# Patient Record
Sex: FEMALE | URBAN - METROPOLITAN AREA
[De-identification: names, ages, dates, MRNs, and addresses within clinical notes are randomized per-mention and may not be internally consistent; named-entity substitution may affect disease eponyms.]

---

## 2023-10-10 ENCOUNTER — APPOINTMENT (OUTPATIENT)
Dept: URBAN - METROPOLITAN AREA CLINIC 306 | Age: 78
Setting detail: DERMATOLOGY
End: 2023-10-10

## 2023-10-10 PROBLEM — C44.91 BASAL CELL CARCINOMA OF SKIN, UNSPECIFIED: Status: ACTIVE | Noted: 2023-10-10

## 2023-10-10 PROCEDURE — OTHER MIPS QUALITY: OTHER

## 2023-10-10 PROCEDURE — 99202 OFFICE O/P NEW SF 15 MIN: CPT

## 2023-10-10 PROCEDURE — OTHER COUNSELING: OTHER

## 2023-10-10 NOTE — PROCEDURE: MIPS QUALITY
Quality 226: Preventive Care And Screening: Tobacco Use: Screening And Cessation Intervention: Tobacco Screening not Performed
Detail Level: Detailed
Quality 431: Preventive Care And Screening: Unhealthy Alcohol Use - Screening: Patient not screened for unhealthy alcohol use using a systematic screening method

## 2023-10-10 NOTE — PROCEDURE: COUNSELING
Patient Specific Counseling (Will Not Stick From Patient To Patient): Pt to set up appt with Anya for initial evaluation.
Detail Level: Detailed

## 2023-10-10 NOTE — HPI: SKIN LESION
How Severe Is Your Skin Lesion?: moderate
Is This A New Presentation, Or A Follow-Up?: Skin Lesion
Additional History: Has Basal on her lip. Doesn’t want to do mohs on the area and would like to see about SRT.

## 2023-10-24 ENCOUNTER — APPOINTMENT (OUTPATIENT)
Dept: URBAN - METROPOLITAN AREA CLINIC 306 | Age: 78
Setting detail: DERMATOLOGY
End: 2023-10-30

## 2023-10-24 ENCOUNTER — APPOINTMENT (OUTPATIENT)
Dept: URBAN - METROPOLITAN AREA CLINIC 306 | Age: 78
Setting detail: DERMATOLOGY
End: 2023-10-24

## 2023-10-24 ENCOUNTER — APPOINTMENT (OUTPATIENT)
Dept: URBAN - METROPOLITAN AREA CLINIC 299 | Age: 78
Setting detail: DERMATOLOGY
End: 2023-10-24

## 2023-10-24 PROBLEM — C44.01 BASAL CELL CARCINOMA OF SKIN OF LIP: Status: ACTIVE | Noted: 2023-10-24

## 2023-10-24 PROCEDURE — OTHER IMAGE GUIDED - SUPERFICIAL RADIOTHERAPY: OTHER

## 2023-10-24 PROCEDURE — 77334 RADIATION TREATMENT AID(S): CPT

## 2023-10-24 PROCEDURE — 77261 THER RADIOLOGY TX PLNG SMPL: CPT

## 2023-10-24 PROCEDURE — G6001 ECHO GUIDANCE RADIOTHERAPY: HCPCS

## 2023-10-24 PROCEDURE — OTHER DIAGNOSIS COMMENT: OTHER

## 2023-10-24 PROCEDURE — OTHER IMAGE GUIDED - SUPERFICIAL RADIOTHERAPY: SIMULATION VISIT: OTHER

## 2023-10-24 PROCEDURE — 77300 RADIATION THERAPY DOSE PLAN: CPT

## 2023-10-24 PROCEDURE — 99213 OFFICE O/P EST LOW 20 MIN: CPT | Mod: 25

## 2023-10-24 PROCEDURE — 77280 THER RAD SIMULAJ FIELD SMPL: CPT

## 2023-10-24 NOTE — PROCEDURE: DIAGNOSIS COMMENT
Render Risk Assessment In Note?: no
Comment: Pathology showed a basaloid proliferation that the pathologist thought was most likely BCC.
Detail Level: Simple

## 2023-10-24 NOTE — PROCEDURE: IMAGE GUIDED - SUPERFICIAL RADIOTHERAPY: SIMULATION VISIT
Bill For Treatment Planning: Yes- (Simple Planning- 1 Site: [id number]) Bill For Treatment Planning: Yes- (Simple Planning- 1 Site: [id number])

## 2023-10-24 NOTE — PROCEDURE: IMAGE GUIDED - SUPERFICIAL RADIOTHERAPY
Pt c/o full body rash and itching after first dose of antibiotic. Pt now is c/o midsternal chest pain and states she does see a cardiologist. Dr. Gross made aware and this RN is awaiting new orders at this time.   
Detail Level: Detailed
Field Number: 1
Lesion Dimensions-X Axis In Cm: 0.5
Shield Size In Cm: 1.0 X 1.0
Applicator Size In Cm: 1.5 cm
Energy (Kv): 70
Treatment Time (Min): 0.42
Time, Dose, Fractionation Factor (Tdf) For Prescription 1: 95
Daily Dose (Cgy): 270.06
Number Of Fractions For Prescription 1: 20
Treatments Per Week: 3
Total Dose For Prescription 1 (Cgy): 5401.20
Add A Second Prescription?: No
Additional Fraction(S) Needed:: 0
Bill For Physics Consultation: No - Render Text Only
Physics Documentation: (Physics Check Verbiage)

## 2023-10-24 NOTE — PROCEDURE: IMAGE GUIDED - SUPERFICIAL RADIOTHERAPY: SIMULATION VISIT
Additional Comments (Add Customization Of Note Here): Well healed biopsy site.  Pt given skin care instructions.  \\nThe rational for Radiotherapy was explained to the patient. The risks and benefits were explained to the patient in detail. Specifically the risk of infection, scarring, radiation dermatitis, prolonged wound healing, incomplete removal, nerve injury, inability to clear the tumor and recurrence were addressed.  The treatment site was clearly identified and confirmed by the patient.

## 2023-10-24 NOTE — PROCEDURE: IMAGE GUIDED - SUPERFICIAL RADIOTHERAPY: SIMULATION VISIT
Simulation Documentation: Physician Orders: Initial Simulation Plan: Simulation with per Fraction Ultrasound\\n\\nPlan: Per the request of Dr.Stephen Roberts, this patient was seen today for Superficial Radiation Therapy planning requiring initial simulation in preparation for treatment of non-melanoma skin cancer. Simulation is necessary to determine the correct patient and treatment portal positioning, to deliver safe and effective radiation therapy. A high-frequency ultrasound image was acquired prior to treatment today for two- dimensional evaluation of tumor volume and will be repeated per fraction for response to treatment, in addition, to geometric accuracy of field placement. Physician evaluation of the ultrasound tumor depth, width, and breadth will be ongoing through the course of treatment and is deemed medically necessary ensuring efficacy of treatment and determine whether to proceed with delivery of therapeutic. Today’s image and setup were evaluated to determine the initiation of treatment. All appropriate blocking and treatment parameters were verified by the radiation therapist and provider.\\nPer Dr. Garrett Roberts, continued per fraction ultrasound guidance and simulation are required for field placement, measurement of tumor depth, width and breadth, progress, and edema monitoring.\\nPer the request of Dr. Garrett Roberts continuing medical physics review as per radiotherapy standard of care post every 5th fraction for the patient, including assessment of treatment parameters,  of dose delivery, and review of patient treatment documentation in support of the provider, is ordered, ensuring efficacy and continued safe delivery of radiotherapy. Included in the physics check is a review of patient setup information, all pertinent simulation and treatment photograph(s) checks, prescription, dose calculation verification, per fraction dose charted correctly, elapsed days and treatment days correctly charted, cumulative dose correct, and review of any prescription changes. Continued medical physics review post every 5th fraction of radiotherapy is requested by the provider for appropriate radiotherapy management and is deemed medically necessary and standard of care.

## 2023-10-30 ENCOUNTER — APPOINTMENT (OUTPATIENT)
Dept: URBAN - METROPOLITAN AREA CLINIC 306 | Age: 78
Setting detail: DERMATOLOGY
End: 2023-10-31

## 2023-10-30 PROBLEM — C44.01 BASAL CELL CARCINOMA OF SKIN OF LIP: Status: ACTIVE | Noted: 2023-10-30

## 2023-10-30 PROCEDURE — OTHER IMAGE GUIDED - SUPERFICIAL RADIOTHERAPY: OTHER

## 2023-10-30 PROCEDURE — OTHER DIAGNOSIS COMMENT: OTHER

## 2023-10-30 PROCEDURE — 77280 THER RAD SIMULAJ FIELD SMPL: CPT

## 2023-10-30 PROCEDURE — OTHER IMAGE GUIDED - SUPERFICIAL RADIOTHERAPY: TREATMENT VISIT: OTHER

## 2023-10-30 PROCEDURE — G6001 ECHO GUIDANCE RADIOTHERAPY: HCPCS | Mod: XU

## 2023-10-30 PROCEDURE — 77401 RADIATION TX DELIVERY SUPFC: CPT

## 2023-10-30 NOTE — PROCEDURE: IMAGE GUIDED - SUPERFICIAL RADIOTHERAPY
Detail Level: Detailed
Field Number: 1
Lesion Dimensions-X Axis In Cm: 0.5
Shield Size In Cm: 1.0 X 1.0
Applicator Size In Cm: 1.5 cm
Energy (Kv): 70
Treatment Time (Min): 0.42
Time, Dose, Fractionation Factor (Tdf) For Prescription 1: 95
Daily Dose (Cgy): 270.06
Number Of Fractions For Prescription 1: 20
Treatments Per Week: 3
Total Dose For Prescription 1 (Cgy): 5401.20
Add A Second Prescription?: No
Additional Fraction(S) Needed:: 0
Bill For Physics Consultation: No - Render Text Only
Physics Documentation: (Physics Check Verbiage)

## 2023-10-30 NOTE — PROCEDURE: IMAGE GUIDED - SUPERFICIAL RADIOTHERAPY: TREATMENT VISIT
Ultrasound Not Used Text: Ultrasound was not performed today due to
Additional Change To Daily Dosage Administered Mid Treatment?: No
Fraction Number: 1
Energy (Kv): 70
Bill For Set Radiation Therapy Field (33693)?: Yes
Treatment Documentation: Physician Orders: Radiotherapy Treatment Plan: Superficial Radiotherapy with Ultrasound\\n\\nPlan: This patient has been treated today with image-guided superficial radiation therapy for non-melanoma skin cancer.  Written informed consent has been previously obtained from this patient for this treatment. This consent is documented in the patient's chart. The patient gave verbal consent to continue treatment today.\\nThe patient was treated with a specific radiation dose and setup as prescribed by the provider listed on this visit note. A Radiation Therapist performed administration of radiation under the supervision of a provider.\\nThe treatment parameters and cumulative dose are indicated above. Prior to administering the radiation, the patient underwent a verification therapeutic radiology simulation-aided field setting defining relevant normal and abnormal target anatomy and acquiring images with a separate and distinct diagnostic high-frequency ultrasound to delineate tissues and determine whether to proceed with delivery of therapeutic, in addition to retrieve data necessary to develop an optimal radiation treatment process for the patient. The field placement simulation documents any change seen in overall tumor volume documented in the patient’s record, allows the clinician to indicate any needed changes in the treatment plan and/or prescription, provides diagnostic evaluation as the basis for performing the therapeutic procedure, and clearly identifies the information needed to decide to proceed with the therapeutic procedure. This process includes verification of the treatment port(s) and proper treatment positioning. All treatment ports were photographed within electronic medical records. The patient's lead blocking along with gross tumor volume and margin was confirmed. Considering superficial radiotherapy is clinical in setup, this requires the physician and radiation therapist to clarify the location interest being treated against initial images, ultrasound, pathology, and patient anatomy. Care was taken to ensure dawson treated were geometrically accurate and properly positioned using therapeutic radiology simulation-aided field setting verification per fraction. This process is also utilized to determine if any prescription or setup changes are necessary. These steps are therefore medically necessary to ensure safe and effective administration of radiation. Ongoing therapeutic radiology simulation-aided field setting verification is ordered throughout the course of therapy.  A high-frequency ultrasound image was acquired today for a two-dimensional evaluation of the tumor volume, depth, width, breadth, review of prior response to treatment, provide geometric accuracy of field placement, and determine whether to proceed with therapeutic delivery.\\nThe field placement and ultrasound imaging, per fraction, is separate and distinct from the initial simulation and is an important task in providing safe administration of superficial radiation therapy. Physician evaluation of the ultrasound information will be ongoing throughout the course of treatment and is deemed medically necessary to ensure the efficacy of treatment, whether to proceed with therapeutic delivery, and determine any necessary changes. Today's images were evaluated for determination of continuation of treatment with the current plan or with necessary changes as appropriate. According to the review of verification therapeutic radiology simulation-aided field setting and imaging, no change is required.\\nAdditionally, the use of ultrasound visualization and targeted assessment allows the patient to be able to see her cancer progress, encouraging the patient to complete and maintain compliance through the full course of prescribed radiotherapy. Per Dr. Garrett Roberts, continued ultrasound guidance and therapeutic radiology simulation-aided field setting verification per fraction is required for field placement, measurement of tumor depth, tissues evaluation, progress, acute effect monitoring, and determination for therapeutic treatment delivery is appropriate.
Daily Dosage (Cgy): 270.06
Additional Comments (Add Customization Of Note Here): Well healed biopsy site.
Add X Modifier?: VIEYRA - Unusual Non-Overlapping Service
Calculate Total Cumulative Dose Automatically Or Manually: Manually
Ultrasound Used Text: High frequency ultrasound depth is 0.98 mm, which is +0.06 mm in difference from previous imaging.

## 2023-10-31 ENCOUNTER — APPOINTMENT (OUTPATIENT)
Dept: URBAN - METROPOLITAN AREA CLINIC 306 | Age: 78
Setting detail: DERMATOLOGY
End: 2023-10-31

## 2023-10-31 PROBLEM — C44.01 BASAL CELL CARCINOMA OF SKIN OF LIP: Status: ACTIVE | Noted: 2023-10-31

## 2023-10-31 PROCEDURE — OTHER IMAGE GUIDED - SUPERFICIAL RADIOTHERAPY: OTHER

## 2023-10-31 PROCEDURE — OTHER IMAGE GUIDED - SUPERFICIAL RADIOTHERAPY: TREATMENT VISIT: OTHER

## 2023-10-31 PROCEDURE — 77401 RADIATION TX DELIVERY SUPFC: CPT

## 2023-10-31 PROCEDURE — G6001 ECHO GUIDANCE RADIOTHERAPY: HCPCS | Mod: XU

## 2023-10-31 PROCEDURE — 77280 THER RAD SIMULAJ FIELD SMPL: CPT

## 2023-10-31 PROCEDURE — OTHER DIAGNOSIS COMMENT: OTHER

## 2023-10-31 NOTE — PROCEDURE: IMAGE GUIDED - SUPERFICIAL RADIOTHERAPY: TREATMENT VISIT
Ultrasound Not Used Text: Ultrasound was not performed today due to
Additional Change To Daily Dosage Administered Mid Treatment?: No
Fraction Number: 2
Energy (Kv): 70
Bill For Set Radiation Therapy Field (88562)?: Yes
Treatment Documentation: Physician Orders: Radiotherapy Treatment Plan: Superficial Radiotherapy with Ultrasound\\n\\nPlan: This patient has been treated today with image-guided superficial radiation therapy for non-melanoma skin cancer.  Written informed consent has been previously obtained from this patient for this treatment. This consent is documented in the patient's chart. The patient gave verbal consent to continue treatment today.\\nThe patient was treated with a specific radiation dose and setup as prescribed by the provider listed on this visit note. A Radiation Therapist performed administration of radiation under the supervision of a provider.\\nThe treatment parameters and cumulative dose are indicated above. Prior to administering the radiation, the patient underwent a verification therapeutic radiology simulation-aided field setting defining relevant normal and abnormal target anatomy and acquiring images with a separate and distinct diagnostic high-frequency ultrasound to delineate tissues and determine whether to proceed with delivery of therapeutic, in addition to retrieve data necessary to develop an optimal radiation treatment process for the patient. The field placement simulation documents any change seen in overall tumor volume documented in the patient’s record, allows the clinician to indicate any needed changes in the treatment plan and/or prescription, provides diagnostic evaluation as the basis for performing the therapeutic procedure, and clearly identifies the information needed to decide to proceed with the therapeutic procedure. This process includes verification of the treatment port(s) and proper treatment positioning. All treatment ports were photographed within electronic medical records. The patient's lead blocking along with gross tumor volume and margin was confirmed. Considering superficial radiotherapy is clinical in setup, this requires the physician and radiation therapist to clarify the location interest being treated against initial images, ultrasound, pathology, and patient anatomy. Care was taken to ensure dawson treated were geometrically accurate and properly positioned using therapeutic radiology simulation-aided field setting verification per fraction. This process is also utilized to determine if any prescription or setup changes are necessary. These steps are therefore medically necessary to ensure safe and effective administration of radiation. Ongoing therapeutic radiology simulation-aided field setting verification is ordered throughout the course of therapy.  A high-frequency ultrasound image was acquired today for a two-dimensional evaluation of the tumor volume, depth, width, breadth, review of prior response to treatment, provide geometric accuracy of field placement, and determine whether to proceed with therapeutic delivery.\\nThe field placement and ultrasound imaging, per fraction, is separate and distinct from the initial simulation and is an important task in providing safe administration of superficial radiation therapy. Physician evaluation of the ultrasound information will be ongoing throughout the course of treatment and is deemed medically necessary to ensure the efficacy of treatment, whether to proceed with therapeutic delivery, and determine any necessary changes. Today's images were evaluated for determination of continuation of treatment with the current plan or with necessary changes as appropriate. According to the review of verification therapeutic radiology simulation-aided field setting and imaging, no change is required.\\nAdditionally, the use of ultrasound visualization and targeted assessment allows the patient to be able to see her cancer progress, encouraging the patient to complete and maintain compliance through the full course of prescribed radiotherapy. Per Dr. Garrett Roberts, continued ultrasound guidance and therapeutic radiology simulation-aided field setting verification per fraction is required for field placement, measurement of tumor depth, tissues evaluation, progress, acute effect monitoring, and determination for therapeutic treatment delivery is appropriate.
Daily Dosage (Cgy): 270.06
Additional Comments (Add Customization Of Note Here): No discomfort.  Pt's site is well healed from biopsy.
Total Cumulative Dose (Cgy): 540.12
Add X Modifier?: VIEYRA - Unusual Non-Overlapping Service
Calculate Total Cumulative Dose Automatically Or Manually: Manually
Prescription Used: 1
Ultrasound Used Text: High frequency ultrasound depth is 0.76 mm, which is -0.22 mm in difference from previous imaging.

## 2023-11-02 ENCOUNTER — APPOINTMENT (OUTPATIENT)
Dept: URBAN - METROPOLITAN AREA CLINIC 306 | Age: 78
Setting detail: DERMATOLOGY
End: 2023-11-02

## 2023-11-02 PROBLEM — C44.01 BASAL CELL CARCINOMA OF SKIN OF LIP: Status: ACTIVE | Noted: 2023-11-02

## 2023-11-02 PROCEDURE — OTHER IMAGE GUIDED - SUPERFICIAL RADIOTHERAPY: TREATMENT VISIT: OTHER

## 2023-11-02 PROCEDURE — OTHER DIAGNOSIS COMMENT: OTHER

## 2023-11-02 PROCEDURE — 77280 THER RAD SIMULAJ FIELD SMPL: CPT

## 2023-11-02 PROCEDURE — OTHER IMAGE GUIDED - SUPERFICIAL RADIOTHERAPY: OTHER

## 2023-11-02 PROCEDURE — 77401 RADIATION TX DELIVERY SUPFC: CPT

## 2023-11-02 PROCEDURE — G6001 ECHO GUIDANCE RADIOTHERAPY: HCPCS | Mod: XU

## 2023-11-02 NOTE — PROCEDURE: IMAGE GUIDED - SUPERFICIAL RADIOTHERAPY: TREATMENT VISIT
Ultrasound Not Used Text: Ultrasound was not performed today due to
Additional Change To Daily Dosage Administered Mid Treatment?: No
Fraction Number: 3
Energy (Kv): 70
Bill For Set Radiation Therapy Field (93770)?: Yes
Treatment Documentation: Physician Orders: Radiotherapy Treatment Plan: Superficial Radiotherapy with Ultrasound\\n\\nPlan: This patient has been treated today with image-guided superficial radiation therapy for non-melanoma skin cancer.  Written informed consent has been previously obtained from this patient for this treatment. This consent is documented in the patient's chart. The patient gave verbal consent to continue treatment today.\\nThe patient was treated with a specific radiation dose and setup as prescribed by the provider listed on this visit note. A Radiation Therapist performed administration of radiation under the supervision of a provider.\\nThe treatment parameters and cumulative dose are indicated above. Prior to administering the radiation, the patient underwent a verification therapeutic radiology simulation-aided field setting defining relevant normal and abnormal target anatomy and acquiring images with a separate and distinct diagnostic high-frequency ultrasound to delineate tissues and determine whether to proceed with delivery of therapeutic, in addition to retrieve data necessary to develop an optimal radiation treatment process for the patient. The field placement simulation documents any change seen in overall tumor volume documented in the patient’s record, allows the clinician to indicate any needed changes in the treatment plan and/or prescription, provides diagnostic evaluation as the basis for performing the therapeutic procedure, and clearly identifies the information needed to decide to proceed with the therapeutic procedure. This process includes verification of the treatment port(s) and proper treatment positioning. All treatment ports were photographed within electronic medical records. The patient's lead blocking along with gross tumor volume and margin was confirmed. Considering superficial radiotherapy is clinical in setup, this requires the physician and radiation therapist to clarify the location interest being treated against initial images, ultrasound, pathology, and patient anatomy. Care was taken to ensure dawson treated were geometrically accurate and properly positioned using therapeutic radiology simulation-aided field setting verification per fraction. This process is also utilized to determine if any prescription or setup changes are necessary. These steps are therefore medically necessary to ensure safe and effective administration of radiation. Ongoing therapeutic radiology simulation-aided field setting verification is ordered throughout the course of therapy.  A high-frequency ultrasound image was acquired today for a two-dimensional evaluation of the tumor volume, depth, width, breadth, review of prior response to treatment, provide geometric accuracy of field placement, and determine whether to proceed with therapeutic delivery.\\nThe field placement and ultrasound imaging, per fraction, is separate and distinct from the initial simulation and is an important task in providing safe administration of superficial radiation therapy. Physician evaluation of the ultrasound information will be ongoing throughout the course of treatment and is deemed medically necessary to ensure the efficacy of treatment, whether to proceed with therapeutic delivery, and determine any necessary changes. Today's images were evaluated for determination of continuation of treatment with the current plan or with necessary changes as appropriate. According to the review of verification therapeutic radiology simulation-aided field setting and imaging, no change is required.\\nAdditionally, the use of ultrasound visualization and targeted assessment allows the patient to be able to see her cancer progress, encouraging the patient to complete and maintain compliance through the full course of prescribed radiotherapy. Per Dr. Garrett Roberts, continued ultrasound guidance and therapeutic radiology simulation-aided field setting verification per fraction is required for field placement, measurement of tumor depth, tissues evaluation, progress, acute effect monitoring, and determination for therapeutic treatment delivery is appropriate.
Daily Dosage (Cgy): 270.06
Additional Comments (Add Customization Of Note Here): Well healed biopsy site.
Total Cumulative Dose (Cgy): 810.18
Add X Modifier?: VIEYRA - Unusual Non-Overlapping Service
Calculate Total Cumulative Dose Automatically Or Manually: Manually
Prescription Used: 1
Ultrasound Used Text: High frequency ultrasound depth is 0.96 mm, which is +0.20 mm in difference from previous imaging.

## 2023-11-06 ENCOUNTER — APPOINTMENT (OUTPATIENT)
Dept: URBAN - METROPOLITAN AREA CLINIC 306 | Age: 78
Setting detail: DERMATOLOGY
End: 2023-11-07

## 2023-11-06 PROBLEM — C44.01 BASAL CELL CARCINOMA OF SKIN OF LIP: Status: ACTIVE | Noted: 2023-11-06

## 2023-11-06 PROCEDURE — OTHER IMAGE GUIDED - SUPERFICIAL RADIOTHERAPY: TREATMENT VISIT: OTHER

## 2023-11-06 PROCEDURE — 77401 RADIATION TX DELIVERY SUPFC: CPT

## 2023-11-06 PROCEDURE — G6001 ECHO GUIDANCE RADIOTHERAPY: HCPCS | Mod: XU

## 2023-11-06 PROCEDURE — OTHER IMAGE GUIDED - SUPERFICIAL RADIOTHERAPY: OTHER

## 2023-11-06 PROCEDURE — 77280 THER RAD SIMULAJ FIELD SMPL: CPT

## 2023-11-06 PROCEDURE — OTHER DIAGNOSIS COMMENT: OTHER

## 2023-11-06 NOTE — PROCEDURE: IMAGE GUIDED - SUPERFICIAL RADIOTHERAPY: TREATMENT VISIT
Ultrasound Not Used Text: Ultrasound was not performed today due to
Additional Change To Daily Dosage Administered Mid Treatment?: No
Fraction Number: 4
Energy (Kv): 70
Bill For Set Radiation Therapy Field (59687)?: Yes
Treatment Documentation: Physician Orders: Radiotherapy Treatment Plan: Superficial Radiotherapy with Ultrasound\\n\\nPlan: This patient has been treated today with image-guided superficial radiation therapy for non-melanoma skin cancer.  Written informed consent has been previously obtained from this patient for this treatment. This consent is documented in the patient's chart. The patient gave verbal consent to continue treatment today.\\nThe patient was treated with a specific radiation dose and setup as prescribed by the provider listed on this visit note. A Radiation Therapist performed administration of radiation under the supervision of a provider.\\nThe treatment parameters and cumulative dose are indicated above. Prior to administering the radiation, the patient underwent a verification therapeutic radiology simulation-aided field setting defining relevant normal and abnormal target anatomy and acquiring images with a separate and distinct diagnostic high-frequency ultrasound to delineate tissues and determine whether to proceed with delivery of therapeutic, in addition to retrieve data necessary to develop an optimal radiation treatment process for the patient. The field placement simulation documents any change seen in overall tumor volume documented in the patient’s record, allows the clinician to indicate any needed changes in the treatment plan and/or prescription, provides diagnostic evaluation as the basis for performing the therapeutic procedure, and clearly identifies the information needed to decide to proceed with the therapeutic procedure. This process includes verification of the treatment port(s) and proper treatment positioning. All treatment ports were photographed within electronic medical records. The patient's lead blocking along with gross tumor volume and margin was confirmed. Considering superficial radiotherapy is clinical in setup, this requires the physician and radiation therapist to clarify the location interest being treated against initial images, ultrasound, pathology, and patient anatomy. Care was taken to ensure dawson treated were geometrically accurate and properly positioned using therapeutic radiology simulation-aided field setting verification per fraction. This process is also utilized to determine if any prescription or setup changes are necessary. These steps are therefore medically necessary to ensure safe and effective administration of radiation. Ongoing therapeutic radiology simulation-aided field setting verification is ordered throughout the course of therapy.  A high-frequency ultrasound image was acquired today for a two-dimensional evaluation of the tumor volume, depth, width, breadth, review of prior response to treatment, provide geometric accuracy of field placement, and determine whether to proceed with therapeutic delivery.\\nThe field placement and ultrasound imaging, per fraction, is separate and distinct from the initial simulation and is an important task in providing safe administration of superficial radiation therapy. Physician evaluation of the ultrasound information will be ongoing throughout the course of treatment and is deemed medically necessary to ensure the efficacy of treatment, whether to proceed with therapeutic delivery, and determine any necessary changes. Today's images were evaluated for determination of continuation of treatment with the current plan or with necessary changes as appropriate. According to the review of verification therapeutic radiology simulation-aided field setting and imaging, no change is required.\\nAdditionally, the use of ultrasound visualization and targeted assessment allows the patient to be able to see her cancer progress, encouraging the patient to complete and maintain compliance through the full course of prescribed radiotherapy. Per Dr. Garrett Roberts, continued ultrasound guidance and therapeutic radiology simulation-aided field setting verification per fraction is required for field placement, measurement of tumor depth, tissues evaluation, progress, acute effect monitoring, and determination for therapeutic treatment delivery is appropriate.
Daily Dosage (Cgy): 270.06
Additional Comments (Add Customization Of Note Here): Skin still looks great.  No tenderness or complaints.
Total Cumulative Dose (Cgy): 1080.24
Add X Modifier?: VIEYRA - Unusual Non-Overlapping Service
Calculate Total Cumulative Dose Automatically Or Manually: Manually
Prescription Used: 1
Ultrasound Used Text: High frequency ultrasound depth is 0.96 mm, which is 0.0 mm in difference from previous imaging.

## 2023-11-07 ENCOUNTER — APPOINTMENT (OUTPATIENT)
Dept: URBAN - METROPOLITAN AREA CLINIC 306 | Age: 78
Setting detail: DERMATOLOGY
End: 2023-11-07

## 2023-11-07 PROBLEM — C44.01 BASAL CELL CARCINOMA OF SKIN OF LIP: Status: ACTIVE | Noted: 2023-11-07

## 2023-11-07 PROCEDURE — 77280 THER RAD SIMULAJ FIELD SMPL: CPT

## 2023-11-07 PROCEDURE — 77401 RADIATION TX DELIVERY SUPFC: CPT

## 2023-11-07 PROCEDURE — OTHER IMAGE GUIDED - SUPERFICIAL RADIOTHERAPY: TREATMENT VISIT: OTHER

## 2023-11-07 PROCEDURE — G6001 ECHO GUIDANCE RADIOTHERAPY: HCPCS | Mod: XU

## 2023-11-07 PROCEDURE — OTHER IMAGE GUIDED - SUPERFICIAL RADIOTHERAPY: EVALUATION VISIT: OTHER

## 2023-11-07 PROCEDURE — OTHER IMAGE GUIDED - SUPERFICIAL RADIOTHERAPY: OTHER

## 2023-11-07 PROCEDURE — OTHER DIAGNOSIS COMMENT: OTHER

## 2023-11-07 PROCEDURE — 77427 RADIATION TX MANAGEMENT X5: CPT

## 2023-11-07 NOTE — PROCEDURE: IMAGE GUIDED - SUPERFICIAL RADIOTHERAPY: TREATMENT VISIT
Ultrasound Not Used Text: Ultrasound was not performed today due to
Additional Change To Daily Dosage Administered Mid Treatment?: No
Fraction Number: 5
Energy (Kv): 70
Bill For Set Radiation Therapy Field (26364)?: Yes
Treatment Documentation: Physician Orders: Radiotherapy Treatment Plan: Superficial Radiotherapy with Ultrasound\\n\\nPlan: This patient has been treated today with image-guided superficial radiation therapy for non-melanoma skin cancer.  Written informed consent has been previously obtained from this patient for this treatment. This consent is documented in the patient's chart. The patient gave verbal consent to continue treatment today.\\nThe patient was treated with a specific radiation dose and setup as prescribed by the provider listed on this visit note. A Radiation Therapist performed administration of radiation under the supervision of a provider.\\nThe treatment parameters and cumulative dose are indicated above. Prior to administering the radiation, the patient underwent a verification therapeutic radiology simulation-aided field setting defining relevant normal and abnormal target anatomy and acquiring images with a separate and distinct diagnostic high-frequency ultrasound to delineate tissues and determine whether to proceed with delivery of therapeutic, in addition to retrieve data necessary to develop an optimal radiation treatment process for the patient. The field placement simulation documents any change seen in overall tumor volume documented in the patient’s record, allows the clinician to indicate any needed changes in the treatment plan and/or prescription, provides diagnostic evaluation as the basis for performing the therapeutic procedure, and clearly identifies the information needed to decide to proceed with the therapeutic procedure. This process includes verification of the treatment port(s) and proper treatment positioning. All treatment ports were photographed within electronic medical records. The patient's lead blocking along with gross tumor volume and margin was confirmed. Considering superficial radiotherapy is clinical in setup, this requires the physician and radiation therapist to clarify the location interest being treated against initial images, ultrasound, pathology, and patient anatomy. Care was taken to ensure dawson treated were geometrically accurate and properly positioned using therapeutic radiology simulation-aided field setting verification per fraction. This process is also utilized to determine if any prescription or setup changes are necessary. These steps are therefore medically necessary to ensure safe and effective administration of radiation. Ongoing therapeutic radiology simulation-aided field setting verification is ordered throughout the course of therapy.  A high-frequency ultrasound image was acquired today for a two-dimensional evaluation of the tumor volume, depth, width, breadth, review of prior response to treatment, provide geometric accuracy of field placement, and determine whether to proceed with therapeutic delivery.\\nThe field placement and ultrasound imaging, per fraction, is separate and distinct from the initial simulation and is an important task in providing safe administration of superficial radiation therapy. Physician evaluation of the ultrasound information will be ongoing throughout the course of treatment and is deemed medically necessary to ensure the efficacy of treatment, whether to proceed with therapeutic delivery, and determine any necessary changes. Today's images were evaluated for determination of continuation of treatment with the current plan or with necessary changes as appropriate. According to the review of verification therapeutic radiology simulation-aided field setting and imaging, no change is required.\\nAdditionally, the use of ultrasound visualization and targeted assessment allows the patient to be able to see her cancer progress, encouraging the patient to complete and maintain compliance through the full course of prescribed radiotherapy. Per Dr. Garrett Roberts, continued ultrasound guidance and therapeutic radiology simulation-aided field setting verification per fraction is required for field placement, measurement of tumor depth, tissues evaluation, progress, acute effect monitoring, and determination for therapeutic treatment delivery is appropriate.
Daily Dosage (Cgy): 270.06
Additional Comments (Add Customization Of Note Here): Well healed biopsy site.  No complaints or concerns.
Total Cumulative Dose (Cgy): 1350.30
Add X Modifier?: VIEYRA - Unusual Non-Overlapping Service
Calculate Total Cumulative Dose Automatically Or Manually: Manually
Prescription Used: 1
Ultrasound Used Text: High frequency ultrasound depth is 0.73 mm, which is -0.23 mm in difference from previous imaging.

## 2023-11-07 NOTE — PROCEDURE: IMAGE GUIDED - SUPERFICIAL RADIOTHERAPY: EVALUATION VISIT
Additional Comments (Add Customization Of Note Here): Pt doing well, no complaints. mild /faint erythema
Bill For Evaluation (27220)?: Yes
Ultrasound Not Used Text: Ultrasound was not performed today due to
Evaluation Plan: Physician Orders: Plan: On Treatment Visit (OTV) with Ultrasound\\n\\nPlan: The patient is undergoing superficial radiation therapy for skin cancer and presents for weekly evaluation and management. Per protocol and as documented on the flow sheet, the patient was questioned as to subjective redness, pruritus, pain, drainage, fatigue, or any other symptoms. Objectively, the radiation area was evaluated with regards to erythema, atrophy, scale, crusting, erosion, ulceration, edema, purpura, tenderness, warmth, drainage, and any other findings. The plan was extensively reviewed including dose and dosing schedule. The simulation and clinical setup were also reviewed as were external and any internal shields and based on this review the appropriateness and sufficiency of treatment was determined.\\nA high-frequency ultrasound image was acquired today for a two-dimensional evaluation of the tumor volume, depth, width, breadth, review of prior response to treatment, provide geometric accuracy of field placement, and determine whether to proceed with therapeutic delivery.\\nPer Dr. Garrett Roberts, continued ultrasound guidance and therapeutic radiology simulation-aided field setting verification per fraction is required for field placement, measurement of tumor depth, tissues evaluation, progress, acute effect monitoring, and determination for therapeutic treatment delivery is appropriate.
Is This Visit For Evaluation During Treatment Or Follow Up Post Treatment?: evaluation
Ultrasound Used Text: Ultrasound depth is 0.73 mm.
Assessment: Appropriate reaction
Radiation Therapy Oncology [facility] (Rtog) Score: 1

## 2023-11-09 ENCOUNTER — APPOINTMENT (OUTPATIENT)
Dept: URBAN - METROPOLITAN AREA CLINIC 306 | Age: 78
Setting detail: DERMATOLOGY
End: 2023-11-09

## 2023-11-09 PROBLEM — C44.01 BASAL CELL CARCINOMA OF SKIN OF LIP: Status: ACTIVE | Noted: 2023-11-09

## 2023-11-09 PROCEDURE — 77280 THER RAD SIMULAJ FIELD SMPL: CPT

## 2023-11-09 PROCEDURE — 77401 RADIATION TX DELIVERY SUPFC: CPT

## 2023-11-09 PROCEDURE — G6001 ECHO GUIDANCE RADIOTHERAPY: HCPCS | Mod: XU

## 2023-11-09 PROCEDURE — OTHER IMAGE GUIDED - SUPERFICIAL RADIOTHERAPY: OTHER

## 2023-11-09 PROCEDURE — OTHER IMAGE GUIDED - SUPERFICIAL RADIOTHERAPY: TREATMENT VISIT: OTHER

## 2023-11-09 PROCEDURE — OTHER DIAGNOSIS COMMENT: OTHER

## 2023-11-11 ENCOUNTER — APPOINTMENT (OUTPATIENT)
Dept: URBAN - METROPOLITAN AREA CLINIC 306 | Age: 78
Setting detail: DERMATOLOGY
End: 2024-01-04

## 2023-11-11 PROBLEM — C44.01 BASAL CELL CARCINOMA OF SKIN OF LIP: Status: ACTIVE | Noted: 2023-11-11

## 2023-11-11 PROCEDURE — 77336 RADIATION PHYSICS CONSULT: CPT

## 2023-11-11 PROCEDURE — OTHER IMAGE GUIDED - SUPERFICIAL RADIOTHERAPY: OTHER

## 2023-11-11 NOTE — PROCEDURE: IMAGE GUIDED - SUPERFICIAL RADIOTHERAPY
Detail Level: Detailed
Field Number: 1
Lesion Dimensions-X Axis In Cm: 0.5
Shield Size In Cm: 1.0 X 1.0
Applicator Size In Cm: 1.5 cm
Energy (Kv): 70
Treatment Time (Min): 0.42
Time, Dose, Fractionation Factor (Tdf) For Prescription 1: 95
Daily Dose (Cgy): 270.06
Number Of Fractions For Prescription 1: 20
Treatments Per Week: 3
Total Dose For Prescription 1 (Cgy): 5401.20
Add A Second Prescription?: No
Additional Fraction(S) Needed:: 0
Add Physics Consultation: Yes
Physics Consultation Performed For Fractions:: 1-6
Physics Documentation: PHYSICS:\\nPhysician Orders: Plan: Medical Physics Checks: Per the request of Dr. Garrett Roberts, continuing medical physics review as per radiotherapy standard of care post every 5th fraction for patient, including assessment of treatment parameters,  of dose delivery, and review of patient treatment documentation in support of the provider, to ensure efficacy and continued safe delivery of radiotherapy. Included in physics check is review of patient setup information, all pertinent simulation and treatment photographs checks, prescription, dose calculation verification, per fraction dose charted correctly, elapsed days and treatment days correctly charted, cumulative dose correct, and review of any prescription changes. Patient was not present, nor was it necessary for the patient to be present for weekly physics review and no other superficial radiotherapy services were rendered on this day. Continued medical physics review post every 5th fraction of therapy is requested by provider for appropriate radiotherapy management and is deemed medically necessary and standard of care.

## 2023-11-13 ENCOUNTER — APPOINTMENT (OUTPATIENT)
Dept: URBAN - METROPOLITAN AREA CLINIC 306 | Age: 78
Setting detail: DERMATOLOGY
End: 2023-11-13

## 2023-11-13 PROBLEM — C44.01 BASAL CELL CARCINOMA OF SKIN OF LIP: Status: ACTIVE | Noted: 2023-11-13

## 2023-11-13 PROCEDURE — OTHER IMAGE GUIDED - SUPERFICIAL RADIOTHERAPY: TREATMENT VISIT: OTHER

## 2023-11-13 PROCEDURE — 77280 THER RAD SIMULAJ FIELD SMPL: CPT

## 2023-11-13 PROCEDURE — G6001 ECHO GUIDANCE RADIOTHERAPY: HCPCS | Mod: XU

## 2023-11-13 PROCEDURE — OTHER IMAGE GUIDED - SUPERFICIAL RADIOTHERAPY: OTHER

## 2023-11-13 PROCEDURE — 77401 RADIATION TX DELIVERY SUPFC: CPT

## 2023-11-13 PROCEDURE — OTHER DIAGNOSIS COMMENT: OTHER

## 2023-11-13 NOTE — PROCEDURE: IMAGE GUIDED - SUPERFICIAL RADIOTHERAPY: TREATMENT VISIT
Ultrasound Not Used Text: Ultrasound was not performed today due to
Additional Change To Daily Dosage Administered Mid Treatment?: No
Fraction Number: 7
Energy (Kv): 70
Bill For Set Radiation Therapy Field (19319)?: Yes
Treatment Documentation: Physician Orders: Radiotherapy Treatment Plan: Superficial Radiotherapy with Ultrasound\\n\\nPlan: This patient has been treated today with image-guided superficial radiation therapy for non-melanoma skin cancer.  Written informed consent has been previously obtained from this patient for this treatment. This consent is documented in the patient's chart. The patient gave verbal consent to continue treatment today.\\nThe patient was treated with a specific radiation dose and setup as prescribed by the provider listed on this visit note. A Radiation Therapist performed administration of radiation under the supervision of a provider.\\nThe treatment parameters and cumulative dose are indicated above. Prior to administering the radiation, the patient underwent a verification therapeutic radiology simulation-aided field setting defining relevant normal and abnormal target anatomy and acquiring images with a separate and distinct diagnostic high-frequency ultrasound to delineate tissues and determine whether to proceed with delivery of therapeutic, in addition to retrieve data necessary to develop an optimal radiation treatment process for the patient. The field placement simulation documents any change seen in overall tumor volume documented in the patient’s record, allows the clinician to indicate any needed changes in the treatment plan and/or prescription, provides diagnostic evaluation as the basis for performing the therapeutic procedure, and clearly identifies the information needed to decide to proceed with the therapeutic procedure. This process includes verification of the treatment port(s) and proper treatment positioning. All treatment ports were photographed within electronic medical records. The patient's lead blocking along with gross tumor volume and margin was confirmed. Considering superficial radiotherapy is clinical in setup, this requires the physician and radiation therapist to clarify the location interest being treated against initial images, ultrasound, pathology, and patient anatomy. Care was taken to ensure dawson treated were geometrically accurate and properly positioned using therapeutic radiology simulation-aided field setting verification per fraction. This process is also utilized to determine if any prescription or setup changes are necessary. These steps are therefore medically necessary to ensure safe and effective administration of radiation. Ongoing therapeutic radiology simulation-aided field setting verification is ordered throughout the course of therapy.  A high-frequency ultrasound image was acquired today for a two-dimensional evaluation of the tumor volume, depth, width, breadth, review of prior response to treatment, provide geometric accuracy of field placement, and determine whether to proceed with therapeutic delivery.\\nThe field placement and ultrasound imaging, per fraction, is separate and distinct from the initial simulation and is an important task in providing safe administration of superficial radiation therapy. Physician evaluation of the ultrasound information will be ongoing throughout the course of treatment and is deemed medically necessary to ensure the efficacy of treatment, whether to proceed with therapeutic delivery, and determine any necessary changes. Today's images were evaluated for determination of continuation of treatment with the current plan or with necessary changes as appropriate. According to the review of verification therapeutic radiology simulation-aided field setting and imaging, no change is required.\\nAdditionally, the use of ultrasound visualization and targeted assessment allows the patient to be able to see her cancer progress, encouraging the patient to complete and maintain compliance through the full course of prescribed radiotherapy. Per Dr. Garrett Roberts, continued ultrasound guidance and therapeutic radiology simulation-aided field setting verification per fraction is required for field placement, measurement of tumor depth, tissues evaluation, progress, acute effect monitoring, and determination for therapeutic treatment delivery is appropriate.
Daily Dosage (Cgy): 270.06
Additional Comments (Add Customization Of Note Here): Faint erythema today.  Slight tingling sensation expressed by patient.  But no discomfort or pain.
Total Cumulative Dose (Cgy): 1890.42
Add X Modifier?: VIEYRA - Unusual Non-Overlapping Service
Calculate Total Cumulative Dose Automatically Or Manually: Manually
Prescription Used: 1
Ultrasound Used Text: High frequency ultrasound depth is 0.88 mm, which is +0.11mm in difference from previous imaging.

## 2023-11-14 ENCOUNTER — APPOINTMENT (OUTPATIENT)
Dept: URBAN - METROPOLITAN AREA CLINIC 306 | Age: 78
Setting detail: DERMATOLOGY
End: 2023-11-16

## 2023-11-14 PROBLEM — C44.01 BASAL CELL CARCINOMA OF SKIN OF LIP: Status: ACTIVE | Noted: 2023-11-14

## 2023-11-14 PROCEDURE — OTHER DIAGNOSIS COMMENT: OTHER

## 2023-11-14 PROCEDURE — 77401 RADIATION TX DELIVERY SUPFC: CPT

## 2023-11-14 PROCEDURE — G6001 ECHO GUIDANCE RADIOTHERAPY: HCPCS | Mod: XU

## 2023-11-14 PROCEDURE — OTHER IMAGE GUIDED - SUPERFICIAL RADIOTHERAPY: TREATMENT VISIT: OTHER

## 2023-11-14 PROCEDURE — OTHER IMAGE GUIDED - SUPERFICIAL RADIOTHERAPY: OTHER

## 2023-11-14 PROCEDURE — 77280 THER RAD SIMULAJ FIELD SMPL: CPT

## 2023-11-14 NOTE — PROCEDURE: IMAGE GUIDED - SUPERFICIAL RADIOTHERAPY: TREATMENT VISIT
Ultrasound Used Text: High frequency ultrasound depth is 0.61 mm, which is -0.27 mm in difference from previous imaging.

## 2023-11-14 NOTE — PROCEDURE: IMAGE GUIDED - SUPERFICIAL RADIOTHERAPY: TREATMENT VISIT
Treatment Documentation: Physician Orders: Radiotherapy Treatment Plan: Superficial Radiotherapy with Ultrasound\\n\\nPlan: This patient has been treated today with image-guided superficial radiation therapy for non-melanoma skin cancer.  Written informed consent has been previously obtained from this patient for this treatment. This consent is documented in the patient's chart. The patient gave verbal consent to continue treatment today.\\nThe patient was treated with a specific radiation dose and setup as prescribed by the provider listed on this visit note. A Radiation Therapist performed administration of radiation under the supervision of a provider.\\nThe treatment parameters and cumulative dose are indicated above. Prior to administering the radiation, the patient underwent a verification therapeutic radiology simulation-aided field setting defining relevant normal and abnormal target anatomy and acquiring images with a separate and distinct diagnostic high-frequency ultrasound to delineate tissues and determine whether to proceed with delivery of therapeutic, in addition to retrieve data necessary to develop an optimal radiation treatment process for the patient. The field placement simulation documents any change seen in overall tumor volume documented in the patient’s record, allows the clinician to indicate any needed changes in the treatment plan and/or prescription, provides diagnostic evaluation as the basis for performing the therapeutic procedure, and clearly identifies the information needed to decide to proceed with the therapeutic procedure. This process includes verification of the treatment port(s) and proper treatment positioning. All treatment ports were photographed within electronic medical records. The patient's lead blocking along with gross tumor volume and margin was confirmed. Considering superficial radiotherapy is clinical in setup, this requires the physician and radiation therapist to clarify the location interest being treated against initial images, ultrasound, pathology, and patient anatomy. Care was taken to ensure dawson treated were geometrically accurate and properly positioned using therapeutic radiology simulation-aided field setting verification per fraction. This process is also utilized to determine if any prescription or setup changes are necessary. These steps are therefore medically necessary to ensure safe and effective administration of radiation. Ongoing therapeutic radiology simulation-aided field setting verification is ordered throughout the course of therapy.  A high-frequency ultrasound image was acquired today for a two-dimensional evaluation of the tumor volume, depth, width, breadth, review of prior response to treatment, provide geometric accuracy of field placement, and determine whether to proceed with therapeutic delivery.\\nThe field placement and ultrasound imaging, per fraction, is separate and distinct from the initial simulation and is an important task in providing safe administration of superficial radiation therapy. Physician evaluation of the ultrasound information will be ongoing throughout the course of treatment and is deemed medically necessary to ensure the efficacy of treatment, whether to proceed with therapeutic delivery, and determine any necessary changes. Today's images were evaluated for determination of continuation of treatment with the current plan or with necessary changes as appropriate. According to the review of verification therapeutic radiology simulation-aided field setting and imaging, no change is required.\\nAdditionally, the use of ultrasound visualization and targeted assessment allows the patient to be able to see her cancer progress, encouraging the patient to complete and maintain compliance through the full course of prescribed radiotherapy. Per Dr. Garrett Roberts, continued ultrasound guidance and therapeutic radiology simulation-aided field setting verification per fraction is required for field placement, measurement of tumor depth, tissues evaluation, progress, acute effect monitoring, and determination for therapeutic treatment delivery is appropriate. Treatment Documentation: Physician Orders: Radiotherapy Treatment Plan: Superficial Radiotherapy with Ultrasound\\n\\nPlan: This patient has been treated today with image-guided superficial radiation therapy for non-melanoma skin cancer.  Written informed consent has been previously obtained from this patient for this treatment. This consent is documented in the patient's chart. The patient gave verbal consent to continue treatment today.\\nThe patient was treated with a specific radiation dose and setup as prescribed by the provider listed on this visit note. A Radiation Therapist performed administration of radiation under the supervision of a provider.\\nThe treatment parameters and cumulative dose are indicated above. Prior to administering the radiation, the patient underwent a verification therapeutic radiology simulation-aided field setting defining relevant normal and abnormal target anatomy and acquiring images with a separate and distinct diagnostic high-frequency ultrasound to delineate tissues and determine whether to proceed with delivery of therapeutic, in addition to retrieve data necessary to develop an optimal radiation treatment process for the patient. The field placement simulation documents any change seen in overall tumor volume documented in the patient’s record, allows the clinician to indicate any needed changes in the treatment plan and/or prescription, provides diagnostic evaluation as the basis for performing the therapeutic procedure, and clearly identifies the information needed to decide to proceed with the therapeutic procedure. This process includes verification of the treatment port(s) and proper treatment positioning. All treatment ports were photographed within electronic medical records. The patient's lead blocking along with gross tumor volume and margin was confirmed. Considering superficial radiotherapy is clinical in setup, this requires the physician and radiation therapist to clarify the location interest being treated against initial images, ultrasound, pathology, and patient anatomy. Care was taken to ensure dawsno treated were geometrically accurate and properly positioned using therapeutic radiology simulation-aided field setting verification per fraction. This process is also utilized to determine if any prescription or setup changes are necessary. These steps are therefore medically necessary to ensure safe and effective administration of radiation. Ongoing therapeutic radiology simulation-aided field setting verification is ordered throughout the course of therapy.  A high-frequency ultrasound image was acquired today for a two-dimensional evaluation of the tumor volume, depth, width, breadth, review of prior response to treatment, provide geometric accuracy of field placement, and determine whether to proceed with therapeutic delivery.\\nThe field placement and ultrasound imaging, per fraction, is separate and distinct from the initial simulation and is an important task in providing safe administration of superficial radiation therapy. Physician evaluation of the ultrasound information will be ongoing throughout the course of treatment and is deemed medically necessary to ensure the efficacy of treatment, whether to proceed with therapeutic delivery, and determine any necessary changes. Today's images were evaluated for determination of continuation of treatment with the current plan or with necessary changes as appropriate. According to the review of verification therapeutic radiology simulation-aided field setting and imaging, no change is required.\\nAdditionally, the use of ultrasound visualization and targeted assessment allows the patient to be able to see her cancer progress, encouraging the patient to complete and maintain compliance through the full course of prescribed radiotherapy. Per Dr. Garrett Roberts, continued ultrasound guidance and therapeutic radiology simulation-aided field setting verification per fraction is required for field placement, measurement of tumor depth, tissues evaluation, progress, acute effect monitoring, and determination for therapeutic treatment delivery is appropriate.

## 2023-11-16 ENCOUNTER — APPOINTMENT (OUTPATIENT)
Dept: URBAN - METROPOLITAN AREA CLINIC 306 | Age: 78
Setting detail: DERMATOLOGY
End: 2023-11-16

## 2023-11-16 PROBLEM — C44.01 BASAL CELL CARCINOMA OF SKIN OF LIP: Status: ACTIVE | Noted: 2023-11-16

## 2023-11-16 PROCEDURE — 77401 RADIATION TX DELIVERY SUPFC: CPT

## 2023-11-16 PROCEDURE — OTHER IMAGE GUIDED - SUPERFICIAL RADIOTHERAPY: TREATMENT VISIT: OTHER

## 2023-11-16 PROCEDURE — OTHER DIAGNOSIS COMMENT: OTHER

## 2023-11-16 PROCEDURE — 77280 THER RAD SIMULAJ FIELD SMPL: CPT

## 2023-11-16 PROCEDURE — OTHER IMAGE GUIDED - SUPERFICIAL RADIOTHERAPY: OTHER

## 2023-11-16 PROCEDURE — G6001 ECHO GUIDANCE RADIOTHERAPY: HCPCS | Mod: XU

## 2023-11-16 NOTE — PROCEDURE: IMAGE GUIDED - SUPERFICIAL RADIOTHERAPY: TREATMENT VISIT
Ultrasound Not Used Text: Ultrasound was not performed today due to
Additional Change To Daily Dosage Administered Mid Treatment?: No
Fraction Number: 9
Energy (Kv): 70
Bill For Set Radiation Therapy Field (53693)?: Yes
Treatment Documentation: Physician Orders: Radiotherapy Treatment Plan: Superficial Radiotherapy with Ultrasound\\n\\nPlan: This patient has been treated today with image-guided superficial radiation therapy for non-melanoma skin cancer.  Written informed consent has been previously obtained from this patient for this treatment. This consent is documented in the patient's chart. The patient gave verbal consent to continue treatment today.\\nThe patient was treated with a specific radiation dose and setup as prescribed by the provider listed on this visit note. A Radiation Therapist performed administration of radiation under the supervision of a provider.\\nThe treatment parameters and cumulative dose are indicated above. Prior to administering the radiation, the patient underwent a verification therapeutic radiology simulation-aided field setting defining relevant normal and abnormal target anatomy and acquiring images with a separate and distinct diagnostic high-frequency ultrasound to delineate tissues and determine whether to proceed with delivery of therapeutic, in addition to retrieve data necessary to develop an optimal radiation treatment process for the patient. The field placement simulation documents any change seen in overall tumor volume documented in the patient’s record, allows the clinician to indicate any needed changes in the treatment plan and/or prescription, provides diagnostic evaluation as the basis for performing the therapeutic procedure, and clearly identifies the information needed to decide to proceed with the therapeutic procedure. This process includes verification of the treatment port(s) and proper treatment positioning. All treatment ports were photographed within electronic medical records. The patient's lead blocking along with gross tumor volume and margin was confirmed. Considering superficial radiotherapy is clinical in setup, this requires the physician and radiation therapist to clarify the location interest being treated against initial images, ultrasound, pathology, and patient anatomy. Care was taken to ensure dawson treated were geometrically accurate and properly positioned using therapeutic radiology simulation-aided field setting verification per fraction. This process is also utilized to determine if any prescription or setup changes are necessary. These steps are therefore medically necessary to ensure safe and effective administration of radiation. Ongoing therapeutic radiology simulation-aided field setting verification is ordered throughout the course of therapy.  A high-frequency ultrasound image was acquired today for a two-dimensional evaluation of the tumor volume, depth, width, breadth, review of prior response to treatment, provide geometric accuracy of field placement, and determine whether to proceed with therapeutic delivery.\\nThe field placement and ultrasound imaging, per fraction, is separate and distinct from the initial simulation and is an important task in providing safe administration of superficial radiation therapy. Physician evaluation of the ultrasound information will be ongoing throughout the course of treatment and is deemed medically necessary to ensure the efficacy of treatment, whether to proceed with therapeutic delivery, and determine any necessary changes. Today's images were evaluated for determination of continuation of treatment with the current plan or with necessary changes as appropriate. According to the review of verification therapeutic radiology simulation-aided field setting and imaging, no change is required.\\nAdditionally, the use of ultrasound visualization and targeted assessment allows the patient to be able to see her cancer progress, encouraging the patient to complete and maintain compliance through the full course of prescribed radiotherapy. Per Dr. Garrett Roberts, continued ultrasound guidance and therapeutic radiology simulation-aided field setting verification per fraction is required for field placement, measurement of tumor depth, tissues evaluation, progress, acute effect monitoring, and determination for therapeutic treatment delivery is appropriate.
Daily Dosage (Cgy): 270.06
Additional Comments (Add Customization Of Note Here): Pt states a slight tingling sensation in the upper lip.  No inside irritation at all.  It's not painful but it just tingles.
Total Cumulative Dose (Cgy): 2430.54
Add X Modifier?: VIEYRA - Unusual Non-Overlapping Service
Calculate Total Cumulative Dose Automatically Or Manually: Manually
Prescription Used: 1
Ultrasound Used Text: High frequency ultrasound depth is 0.54 mm, which is -0.07 mm in difference from previous imaging.

## 2023-11-20 ENCOUNTER — APPOINTMENT (OUTPATIENT)
Dept: URBAN - METROPOLITAN AREA CLINIC 306 | Age: 78
Setting detail: DERMATOLOGY
End: 2023-11-21

## 2023-11-20 PROBLEM — C44.01 BASAL CELL CARCINOMA OF SKIN OF LIP: Status: ACTIVE | Noted: 2023-11-20

## 2023-11-20 PROCEDURE — OTHER IMAGE GUIDED - SUPERFICIAL RADIOTHERAPY: OTHER

## 2023-11-20 PROCEDURE — G6001 ECHO GUIDANCE RADIOTHERAPY: HCPCS | Mod: XU

## 2023-11-20 PROCEDURE — 77427 RADIATION TX MANAGEMENT X5: CPT

## 2023-11-20 PROCEDURE — OTHER DIAGNOSIS COMMENT: OTHER

## 2023-11-20 PROCEDURE — OTHER IMAGE GUIDED - SUPERFICIAL RADIOTHERAPY: TREATMENT VISIT: OTHER

## 2023-11-20 PROCEDURE — 77401 RADIATION TX DELIVERY SUPFC: CPT

## 2023-11-20 PROCEDURE — OTHER IMAGE GUIDED - SUPERFICIAL RADIOTHERAPY: EVALUATION VISIT: OTHER

## 2023-11-20 PROCEDURE — 77280 THER RAD SIMULAJ FIELD SMPL: CPT

## 2023-11-20 NOTE — PROCEDURE: IMAGE GUIDED - SUPERFICIAL RADIOTHERAPY: EVALUATION VISIT
Is This Visit For Evaluation During Treatment Or Follow Up Post Treatment?: evaluation
Was Ultrasound Performed Today?: Yes
Additional Comments (Add Customization Of Note Here): Placed aquaphor on her lip after treatment.
Ultrasound Not Used Text: Ultrasound was not performed today due to
Radiation Therapy Oncology Group (Rtog) Score: 2
Assessment: Appropriate reaction
Ultrasound Used Text: Ultrasound depth is 0.76 mm.
Evaluation Plan: Physician Orders: Plan: On Treatment Visit (OTV) with Ultrasound\\n\\nPlan: The patient is undergoing superficial radiation therapy for skin cancer and presents for weekly evaluation and management. Per protocol and as documented on the flow sheet, the patient was questioned as to subjective redness, pruritus, pain, drainage, fatigue, or any other symptoms. Objectively, the radiation area was evaluated with regards to erythema, atrophy, scale, crusting, erosion, ulceration, edema, purpura, tenderness, warmth, drainage, and any other findings. The plan was extensively reviewed including dose and dosing schedule. The simulation and clinical setup were also reviewed as were external and any internal shields and based on this review the appropriateness and sufficiency of treatment was determined.\\nA high-frequency ultrasound image was acquired today for a two-dimensional evaluation of the tumor volume, depth, width, breadth, review of prior response to treatment, provide geometric accuracy of field placement, and determine whether to proceed with therapeutic delivery.\\nPer Dr. Garrett Roberts, continued ultrasound guidance and therapeutic radiology simulation-aided field setting verification per fraction is required for field placement, measurement of tumor depth, tissues evaluation, progress, acute effect monitoring, and determination for therapeutic treatment delivery is appropriate.

## 2023-11-20 NOTE — PROCEDURE: IMAGE GUIDED - SUPERFICIAL RADIOTHERAPY: TREATMENT VISIT
Ultrasound Not Used Text: Ultrasound was not performed today due to
Additional Change To Daily Dosage Administered Mid Treatment?: No
Fraction Number: 10
Energy (Kv): 70
Bill For Set Radiation Therapy Field (99394)?: Yes
Treatment Documentation: Physician Orders: Radiotherapy Treatment Plan: Superficial Radiotherapy with Ultrasound\\n\\nPlan: This patient has been treated today with image-guided superficial radiation therapy for non-melanoma skin cancer.  Written informed consent has been previously obtained from this patient for this treatment. This consent is documented in the patient's chart. The patient gave verbal consent to continue treatment today.\\nThe patient was treated with a specific radiation dose and setup as prescribed by the provider listed on this visit note. A Radiation Therapist performed administration of radiation under the supervision of a provider.\\nThe treatment parameters and cumulative dose are indicated above. Prior to administering the radiation, the patient underwent a verification therapeutic radiology simulation-aided field setting defining relevant normal and abnormal target anatomy and acquiring images with a separate and distinct diagnostic high-frequency ultrasound to delineate tissues and determine whether to proceed with delivery of therapeutic, in addition to retrieve data necessary to develop an optimal radiation treatment process for the patient. The field placement simulation documents any change seen in overall tumor volume documented in the patient’s record, allows the clinician to indicate any needed changes in the treatment plan and/or prescription, provides diagnostic evaluation as the basis for performing the therapeutic procedure, and clearly identifies the information needed to decide to proceed with the therapeutic procedure. This process includes verification of the treatment port(s) and proper treatment positioning. All treatment ports were photographed within electronic medical records. The patient's lead blocking along with gross tumor volume and margin was confirmed. Considering superficial radiotherapy is clinical in setup, this requires the physician and radiation therapist to clarify the location interest being treated against initial images, ultrasound, pathology, and patient anatomy. Care was taken to ensure dawson treated were geometrically accurate and properly positioned using therapeutic radiology simulation-aided field setting verification per fraction. This process is also utilized to determine if any prescription or setup changes are necessary. These steps are therefore medically necessary to ensure safe and effective administration of radiation. Ongoing therapeutic radiology simulation-aided field setting verification is ordered throughout the course of therapy.  A high-frequency ultrasound image was acquired today for a two-dimensional evaluation of the tumor volume, depth, width, breadth, review of prior response to treatment, provide geometric accuracy of field placement, and determine whether to proceed with therapeutic delivery.\\nThe field placement and ultrasound imaging, per fraction, is separate and distinct from the initial simulation and is an important task in providing safe administration of superficial radiation therapy. Physician evaluation of the ultrasound information will be ongoing throughout the course of treatment and is deemed medically necessary to ensure the efficacy of treatment, whether to proceed with therapeutic delivery, and determine any necessary changes. Today's images were evaluated for determination of continuation of treatment with the current plan or with necessary changes as appropriate. According to the review of verification therapeutic radiology simulation-aided field setting and imaging, no change is required.\\nAdditionally, the use of ultrasound visualization and targeted assessment allows the patient to be able to see her cancer progress, encouraging the patient to complete and maintain compliance through the full course of prescribed radiotherapy. Per Dr. Garrett Roberts, continued ultrasound guidance and therapeutic radiology simulation-aided field setting verification per fraction is required for field placement, measurement of tumor depth, tissues evaluation, progress, acute effect monitoring, and determination for therapeutic treatment delivery is appropriate.
Daily Dosage (Cgy): 270.06
Additional Comments (Add Customization Of Note Here): Pt states tender to touch only. No inside soreness. Mild erythema and slight scaly skin.  I did put aquaphor on after treatment today.
Total Cumulative Dose (Cgy): 2700.60
Add X Modifier?: VIEYRA - Unusual Non-Overlapping Service
Calculate Total Cumulative Dose Automatically Or Manually: Manually
Prescription Used: 1
Ultrasound Used Text: High frequency ultrasound depth is 0.76 mm, which is +o.16mm in difference from previous imaging.

## 2023-11-21 ENCOUNTER — APPOINTMENT (OUTPATIENT)
Dept: URBAN - METROPOLITAN AREA CLINIC 306 | Age: 78
Setting detail: DERMATOLOGY
End: 2023-11-22

## 2023-11-21 PROBLEM — C44.01 BASAL CELL CARCINOMA OF SKIN OF LIP: Status: ACTIVE | Noted: 2023-11-21

## 2023-11-21 PROCEDURE — OTHER DIAGNOSIS COMMENT: OTHER

## 2023-11-21 PROCEDURE — G6001 ECHO GUIDANCE RADIOTHERAPY: HCPCS | Mod: XU

## 2023-11-21 PROCEDURE — OTHER IMAGE GUIDED - SUPERFICIAL RADIOTHERAPY: OTHER

## 2023-11-21 PROCEDURE — OTHER IMAGE GUIDED - SUPERFICIAL RADIOTHERAPY: TREATMENT VISIT: OTHER

## 2023-11-21 PROCEDURE — 77401 RADIATION TX DELIVERY SUPFC: CPT

## 2023-11-21 PROCEDURE — 77280 THER RAD SIMULAJ FIELD SMPL: CPT

## 2023-11-21 NOTE — PROCEDURE: IMAGE GUIDED - SUPERFICIAL RADIOTHERAPY: TREATMENT VISIT
Ultrasound Not Used Text: Ultrasound was not performed today due to
Additional Change To Daily Dosage Administered Mid Treatment?: No
Fraction Number: 11
Energy (Kv): 70
Bill For Set Radiation Therapy Field (77820)?: Yes
Treatment Documentation: Physician Orders: Radiotherapy Treatment Plan: Superficial Radiotherapy with Ultrasound\\n\\nPlan: This patient has been treated today with image-guided superficial radiation therapy for non-melanoma skin cancer.  Written informed consent has been previously obtained from this patient for this treatment. This consent is documented in the patient's chart. The patient gave verbal consent to continue treatment today.\\nThe patient was treated with a specific radiation dose and setup as prescribed by the provider listed on this visit note. A Radiation Therapist performed administration of radiation under the supervision of a provider.\\nThe treatment parameters and cumulative dose are indicated above. Prior to administering the radiation, the patient underwent a verification therapeutic radiology simulation-aided field setting defining relevant normal and abnormal target anatomy and acquiring images with a separate and distinct diagnostic high-frequency ultrasound to delineate tissues and determine whether to proceed with delivery of therapeutic, in addition to retrieve data necessary to develop an optimal radiation treatment process for the patient. The field placement simulation documents any change seen in overall tumor volume documented in the patient’s record, allows the clinician to indicate any needed changes in the treatment plan and/or prescription, provides diagnostic evaluation as the basis for performing the therapeutic procedure, and clearly identifies the information needed to decide to proceed with the therapeutic procedure. This process includes verification of the treatment port(s) and proper treatment positioning. All treatment ports were photographed within electronic medical records. The patient's lead blocking along with gross tumor volume and margin was confirmed. Considering superficial radiotherapy is clinical in setup, this requires the physician and radiation therapist to clarify the location interest being treated against initial images, ultrasound, pathology, and patient anatomy. Care was taken to ensure dawson treated were geometrically accurate and properly positioned using therapeutic radiology simulation-aided field setting verification per fraction. This process is also utilized to determine if any prescription or setup changes are necessary. These steps are therefore medically necessary to ensure safe and effective administration of radiation. Ongoing therapeutic radiology simulation-aided field setting verification is ordered throughout the course of therapy.  A high-frequency ultrasound image was acquired today for a two-dimensional evaluation of the tumor volume, depth, width, breadth, review of prior response to treatment, provide geometric accuracy of field placement, and determine whether to proceed with therapeutic delivery.\\nThe field placement and ultrasound imaging, per fraction, is separate and distinct from the initial simulation and is an important task in providing safe administration of superficial radiation therapy. Physician evaluation of the ultrasound information will be ongoing throughout the course of treatment and is deemed medically necessary to ensure the efficacy of treatment, whether to proceed with therapeutic delivery, and determine any necessary changes. Today's images were evaluated for determination of continuation of treatment with the current plan or with necessary changes as appropriate. According to the review of verification therapeutic radiology simulation-aided field setting and imaging, no change is required.\\nAdditionally, the use of ultrasound visualization and targeted assessment allows the patient to be able to see her cancer progress, encouraging the patient to complete and maintain compliance through the full course of prescribed radiotherapy. Per Dr. Garrett Roberts, continued ultrasound guidance and therapeutic radiology simulation-aided field setting verification per fraction is required for field placement, measurement of tumor depth, tissues evaluation, progress, acute effect monitoring, and determination for therapeutic treatment delivery is appropriate.
Daily Dosage (Cgy): 270.06
Additional Comments (Add Customization Of Note Here): No difference from yesterday. Still a slight tingling in the lip but nothing painful or intolerable.
Total Cumulative Dose (Cgy): 2970.66
Add X Modifier?: VIEYRA - Unusual Non-Overlapping Service
Calculate Total Cumulative Dose Automatically Or Manually: Manually
Prescription Used: 1
Ultrasound Used Text: High frequency ultrasound depth is 0.89 mm, which is +0.13 mm in difference from previous imaging.

## 2023-11-22 ENCOUNTER — APPOINTMENT (OUTPATIENT)
Dept: URBAN - METROPOLITAN AREA CLINIC 306 | Age: 78
Setting detail: DERMATOLOGY
End: 2023-11-27

## 2023-11-22 PROBLEM — C44.01 BASAL CELL CARCINOMA OF SKIN OF LIP: Status: ACTIVE | Noted: 2023-11-22

## 2023-11-22 PROCEDURE — OTHER DIAGNOSIS COMMENT: OTHER

## 2023-11-22 PROCEDURE — 77280 THER RAD SIMULAJ FIELD SMPL: CPT

## 2023-11-22 PROCEDURE — OTHER IMAGE GUIDED - SUPERFICIAL RADIOTHERAPY: TREATMENT VISIT: OTHER

## 2023-11-22 PROCEDURE — OTHER IMAGE GUIDED - SUPERFICIAL RADIOTHERAPY: OTHER

## 2023-11-22 PROCEDURE — G6001 ECHO GUIDANCE RADIOTHERAPY: HCPCS | Mod: XU

## 2023-11-22 PROCEDURE — 77401 RADIATION TX DELIVERY SUPFC: CPT

## 2023-11-22 NOTE — PROCEDURE: IMAGE GUIDED - SUPERFICIAL RADIOTHERAPY: TREATMENT VISIT
Ultrasound Not Used Text: Ultrasound was not performed today due to
Additional Change To Daily Dosage Administered Mid Treatment?: No
Fraction Number: 12
Energy (Kv): 70
Bill For Set Radiation Therapy Field (68513)?: Yes
Treatment Documentation: Physician Orders: Radiotherapy Treatment Plan: Superficial Radiotherapy with Ultrasound\\n\\nPlan: This patient has been treated today with image-guided superficial radiation therapy for non-melanoma skin cancer.  Written informed consent has been previously obtained from this patient for this treatment. This consent is documented in the patient's chart. The patient gave verbal consent to continue treatment today.\\nThe patient was treated with a specific radiation dose and setup as prescribed by the provider listed on this visit note. A Radiation Therapist performed administration of radiation under the supervision of a provider.\\nThe treatment parameters and cumulative dose are indicated above. Prior to administering the radiation, the patient underwent a verification therapeutic radiology simulation-aided field setting defining relevant normal and abnormal target anatomy and acquiring images with a separate and distinct diagnostic high-frequency ultrasound to delineate tissues and determine whether to proceed with delivery of therapeutic, in addition to retrieve data necessary to develop an optimal radiation treatment process for the patient. The field placement simulation documents any change seen in overall tumor volume documented in the patient’s record, allows the clinician to indicate any needed changes in the treatment plan and/or prescription, provides diagnostic evaluation as the basis for performing the therapeutic procedure, and clearly identifies the information needed to decide to proceed with the therapeutic procedure. This process includes verification of the treatment port(s) and proper treatment positioning. All treatment ports were photographed within electronic medical records. The patient's lead blocking along with gross tumor volume and margin was confirmed. Considering superficial radiotherapy is clinical in setup, this requires the physician and radiation therapist to clarify the location interest being treated against initial images, ultrasound, pathology, and patient anatomy. Care was taken to ensure dawson treated were geometrically accurate and properly positioned using therapeutic radiology simulation-aided field setting verification per fraction. This process is also utilized to determine if any prescription or setup changes are necessary. These steps are therefore medically necessary to ensure safe and effective administration of radiation. Ongoing therapeutic radiology simulation-aided field setting verification is ordered throughout the course of therapy.  A high-frequency ultrasound image was acquired today for a two-dimensional evaluation of the tumor volume, depth, width, breadth, review of prior response to treatment, provide geometric accuracy of field placement, and determine whether to proceed with therapeutic delivery.\\nThe field placement and ultrasound imaging, per fraction, is separate and distinct from the initial simulation and is an important task in providing safe administration of superficial radiation therapy. Physician evaluation of the ultrasound information will be ongoing throughout the course of treatment and is deemed medically necessary to ensure the efficacy of treatment, whether to proceed with therapeutic delivery, and determine any necessary changes. Today's images were evaluated for determination of continuation of treatment with the current plan or with necessary changes as appropriate. According to the review of verification therapeutic radiology simulation-aided field setting and imaging, no change is required.\\nAdditionally, the use of ultrasound visualization and targeted assessment allows the patient to be able to see her cancer progress, encouraging the patient to complete and maintain compliance through the full course of prescribed radiotherapy. Per Dr. Garrett Roberts, continued ultrasound guidance and therapeutic radiology simulation-aided field setting verification per fraction is required for field placement, measurement of tumor depth, tissues evaluation, progress, acute effect monitoring, and determination for therapeutic treatment delivery is appropriate.
Daily Dosage (Cgy): 270.06
Additional Comments (Add Customization Of Note Here): Slight scaling of the lip.  Tingling sensation.
Total Cumulative Dose (Cgy): 3240.72
Add X Modifier?: VIEYRA - Unusual Non-Overlapping Service
Calculate Total Cumulative Dose Automatically Or Manually: Manually
Prescription Used: 1
Ultrasound Used Text: High frequency ultrasound depth is 0.89 mm, which is +0.13 mm in difference from previous imaging.

## 2023-11-24 ENCOUNTER — APPOINTMENT (OUTPATIENT)
Dept: URBAN - METROPOLITAN AREA CLINIC 306 | Age: 78
Setting detail: DERMATOLOGY
End: 2024-01-04

## 2023-11-24 PROBLEM — C44.01 BASAL CELL CARCINOMA OF SKIN OF LIP: Status: ACTIVE | Noted: 2023-11-24

## 2023-11-24 PROCEDURE — 77336 RADIATION PHYSICS CONSULT: CPT

## 2023-11-24 PROCEDURE — OTHER IMAGE GUIDED - SUPERFICIAL RADIOTHERAPY: OTHER

## 2023-11-24 NOTE — PROCEDURE: IMAGE GUIDED - SUPERFICIAL RADIOTHERAPY
Detail Level: Detailed
Field Number: 1
Lesion Dimensions-X Axis In Cm: 0.5
Shield Size In Cm: 1.0 X 1.0
Applicator Size In Cm: 1.5 cm
Energy (Kv): 70
Treatment Time (Min): 0.42
Time, Dose, Fractionation Factor (Tdf) For Prescription 1: 95
Daily Dose (Cgy): 270.06
Number Of Fractions For Prescription 1: 20
Treatments Per Week: 3
Total Dose For Prescription 1 (Cgy): 5401.20
Add A Second Prescription?: No
Additional Fraction(S) Needed:: 0
Add Physics Consultation: Yes
Physics Consultation Performed For Fractions:: 7-12
Physics Documentation: PHYSICS:\\nPhysician Orders: Plan: Medical Physics Checks: Per the request of Dr. Garrett Roberts, continuing medical physics review as per radiotherapy standard of care post every 5th fraction for patient, including assessment of treatment parameters,  of dose delivery, and review of patient treatment documentation in support of the provider, to ensure efficacy and continued safe delivery of radiotherapy. Included in physics check is review of patient setup information, all pertinent simulation and treatment photographs checks, prescription, dose calculation verification, per fraction dose charted correctly, elapsed days and treatment days correctly charted, cumulative dose correct, and review of any prescription changes. Patient was not present, nor was it necessary for the patient to be present for weekly physics review and no other superficial radiotherapy services were rendered on this day. Continued medical physics review post every 5th fraction of therapy is requested by provider for appropriate radiotherapy management and is deemed medically necessary and standard of care.

## 2023-11-28 ENCOUNTER — APPOINTMENT (OUTPATIENT)
Dept: URBAN - METROPOLITAN AREA CLINIC 306 | Age: 78
Setting detail: DERMATOLOGY
End: 2023-11-28

## 2023-11-28 PROBLEM — C44.01 BASAL CELL CARCINOMA OF SKIN OF LIP: Status: ACTIVE | Noted: 2023-11-28

## 2023-11-28 PROCEDURE — 77401 RADIATION TX DELIVERY SUPFC: CPT

## 2023-11-28 PROCEDURE — OTHER IMAGE GUIDED - SUPERFICIAL RADIOTHERAPY: OTHER

## 2023-11-28 PROCEDURE — 77280 THER RAD SIMULAJ FIELD SMPL: CPT

## 2023-11-28 PROCEDURE — OTHER DIAGNOSIS COMMENT: OTHER

## 2023-11-28 PROCEDURE — OTHER IMAGE GUIDED - SUPERFICIAL RADIOTHERAPY: TREATMENT VISIT: OTHER

## 2023-11-28 PROCEDURE — G6001 ECHO GUIDANCE RADIOTHERAPY: HCPCS | Mod: XU

## 2023-11-28 NOTE — PROCEDURE: IMAGE GUIDED - SUPERFICIAL RADIOTHERAPY: TREATMENT VISIT
Ultrasound Not Used Text: Ultrasound was not performed today due to
Additional Change To Daily Dosage Administered Mid Treatment?: No
Fraction Number: 13
Energy (Kv): 70
Bill For Set Radiation Therapy Field (81600)?: Yes
Treatment Documentation: Physician Orders: Radiotherapy Treatment Plan: Superficial Radiotherapy with Ultrasound\\nPlan: This patient has been treated today with image-guided superficial radiation therapy for non-melanoma skin cancer.  Written informed consent has been previously obtained from this patient for this treatment. This consent is documented in the patient's chart. The patient gave verbal consent to continue treatment today.\\nThe patient was treated with a specific radiation dose and setup as prescribed by the provider listed on this visit note. A Radiation Therapist performed administration of radiation under the supervision of a provider.\\nThe treatment parameters and cumulative dose are indicated above. Prior to administering the radiation, the patient underwent a verification therapeutic radiology simulation-aided field setting defining relevant normal and abnormal target anatomy and acquiring images with a separate and distinct diagnostic high-frequency ultrasound to delineate tissues and determine whether to proceed with delivery of therapeutic, in addition to retrieve data necessary to develop an optimal radiation treatment process for the patient. The field placement simulation documents any change seen in overall tumor volume documented in the patient’s record, allows the clinician to indicate any needed changes in the treatment plan and/or prescription, provides diagnostic evaluation as the basis for performing the therapeutic procedure, and clearly identifies the information needed to decide to proceed with the therapeutic procedure. This process includes verification of the treatment port(s) and proper treatment positioning. All treatment ports were photographed within electronic medical records. The patient's lead blocking along with gross tumor volume and margin was confirmed. Considering superficial radiotherapy is clinical in setup, this requires the physician and radiation therapist to clarify the location interest being treated against initial images, ultrasound, pathology, and patient anatomy. Care was taken to ensure dawson treated were geometrically accurate and properly positioned using therapeutic radiology simulation-aided field setting verification per fraction. This process is also utilized to determine if any prescription or setup changes are necessary. These steps are therefore medically necessary to ensure safe and effective administration of radiation. Ongoing therapeutic radiology simulation-aided field setting verification is ordered throughout the course of therapy.  A high-frequency ultrasound image was acquired today for a two-dimensional evaluation of the tumor volume, depth, width, breadth, review of prior response to treatment, provide geometric accuracy of field placement, and determine whether to proceed with therapeutic delivery.\\nThe field placement and ultrasound imaging, per fraction, is separate and distinct from the initial simulation and is an important task in providing safe administration of superficial radiation therapy. Physician evaluation of the ultrasound information will be ongoing throughout the course of treatment and is deemed medically necessary to ensure the efficacy of treatment, whether to proceed with therapeutic delivery, and determine any necessary changes. Today's images were evaluated for determination of continuation of treatment with the current plan or with necessary changes as appropriate. According to the review of verification therapeutic radiology simulation-aided field setting and imaging, no change is required.\\nAdditionally, the use of ultrasound visualization and targeted assessment allows the patient to be able to see her cancer progress, encouraging the patient to complete and maintain compliance through the full course of prescribed radiotherapy. Per Dr. Garrett Roberts, continued ultrasound guidance and therapeutic radiology simulation-aided field setting verification per fraction is required for field placement, measurement of tumor depth, tissues evaluation, progress, acute effect monitoring, and determination for therapeutic treatment delivery is appropriate.
Daily Dosage (Cgy): 270.06
Additional Comments (Add Customization Of Note Here): Dry, flaky skin on lip.  Ultrasound shows mild edema directly under the skin.
Total Cumulative Dose (Cgy): 3510.78
Add X Modifier?: VIEYRA - Unusual Non-Overlapping Service
Calculate Total Cumulative Dose Automatically Or Manually: Manually
Prescription Used: 1
Ultrasound Used Text: High frequency ultrasound depth is 0.92 mm, which is -0.07 mm in difference from previous imaging.

## 2023-11-30 ENCOUNTER — APPOINTMENT (OUTPATIENT)
Dept: URBAN - METROPOLITAN AREA CLINIC 306 | Age: 78
Setting detail: DERMATOLOGY
End: 2023-11-30

## 2023-11-30 PROBLEM — C44.01 BASAL CELL CARCINOMA OF SKIN OF LIP: Status: ACTIVE | Noted: 2023-11-30

## 2023-11-30 PROCEDURE — 77401 RADIATION TX DELIVERY SUPFC: CPT

## 2023-11-30 PROCEDURE — OTHER IMAGE GUIDED - SUPERFICIAL RADIOTHERAPY: TREATMENT VISIT: OTHER

## 2023-11-30 PROCEDURE — OTHER DIAGNOSIS COMMENT: OTHER

## 2023-11-30 PROCEDURE — G6001 ECHO GUIDANCE RADIOTHERAPY: HCPCS | Mod: XU

## 2023-11-30 PROCEDURE — 77280 THER RAD SIMULAJ FIELD SMPL: CPT

## 2023-11-30 PROCEDURE — OTHER IMAGE GUIDED - SUPERFICIAL RADIOTHERAPY: OTHER

## 2023-11-30 NOTE — PROCEDURE: IMAGE GUIDED - SUPERFICIAL RADIOTHERAPY: TREATMENT VISIT
Ultrasound Not Used Text: Ultrasound was not performed today due to
Additional Change To Daily Dosage Administered Mid Treatment?: No
Fraction Number: 14
Energy (Kv): 70
Bill For Set Radiation Therapy Field (71865)?: Yes
Treatment Documentation: Physician Orders: Radiotherapy Treatment Plan: Superficial Radiotherapy with Ultrasound\\nPlan: This patient has been treated today with image-guided superficial radiation therapy for non-melanoma skin cancer.  Written informed consent has been previously obtained from this patient for this treatment. This consent is documented in the patient's chart. The patient gave verbal consent to continue treatment today.\\nThe patient was treated with a specific radiation dose and setup as prescribed by the provider listed on this visit note. A Radiation Therapist performed administration of radiation under the supervision of a provider.\\nThe treatment parameters and cumulative dose are indicated above. Prior to administering the radiation, the patient underwent a verification therapeutic radiology simulation-aided field setting defining relevant normal and abnormal target anatomy and acquiring images with a separate and distinct diagnostic high-frequency ultrasound to delineate tissues and determine whether to proceed with delivery of therapeutic, in addition to retrieve data necessary to develop an optimal radiation treatment process for the patient. The field placement simulation documents any change seen in overall tumor volume documented in the patient’s record, allows the clinician to indicate any needed changes in the treatment plan and/or prescription, provides diagnostic evaluation as the basis for performing the therapeutic procedure, and clearly identifies the information needed to decide to proceed with the therapeutic procedure. This process includes verification of the treatment port(s) and proper treatment positioning. All treatment ports were photographed within electronic medical records. The patient's lead blocking along with gross tumor volume and margin was confirmed. Considering superficial radiotherapy is clinical in setup, this requires the physician and radiation therapist to clarify the location interest being treated against initial images, ultrasound, pathology, and patient anatomy. Care was taken to ensure dawson treated were geometrically accurate and properly positioned using therapeutic radiology simulation-aided field setting verification per fraction. This process is also utilized to determine if any prescription or setup changes are necessary. These steps are therefore medically necessary to ensure safe and effective administration of radiation. Ongoing therapeutic radiology simulation-aided field setting verification is ordered throughout the course of therapy.  A high-frequency ultrasound image was acquired today for a two-dimensional evaluation of the tumor volume, depth, width, breadth, review of prior response to treatment, provide geometric accuracy of field placement, and determine whether to proceed with therapeutic delivery.\\nThe field placement and ultrasound imaging, per fraction, is separate and distinct from the initial simulation and is an important task in providing safe administration of superficial radiation therapy. Physician evaluation of the ultrasound information will be ongoing throughout the course of treatment and is deemed medically necessary to ensure the efficacy of treatment, whether to proceed with therapeutic delivery, and determine any necessary changes. Today's images were evaluated for determination of continuation of treatment with the current plan or with necessary changes as appropriate. According to the review of verification therapeutic radiology simulation-aided field setting and imaging, no change is required.\\nAdditionally, the use of ultrasound visualization and targeted assessment allows the patient to be able to see her cancer progress, encouraging the patient to complete and maintain compliance through the full course of prescribed radiotherapy. Per Dr. Garrett Roberts, continued ultrasound guidance and therapeutic radiology simulation-aided field setting verification per fraction is required for field placement, measurement of tumor depth, tissues evaluation, progress, acute effect monitoring, and determination for therapeutic treatment delivery is appropriate.
Daily Dosage (Cgy): 270.06
Additional Comments (Add Customization Of Note Here): Dry, flaky skin on lip. Mrs. Khanna states her lip is only slightly tender.
Total Cumulative Dose (Cgy): 3780.84
Add X Modifier?: VIEYRA - Unusual Non-Overlapping Service
Calculate Total Cumulative Dose Automatically Or Manually: Manually
Prescription Used: 1
Ultrasound Used Text: High frequency ultrasound depth is 01.30 mm, which is +0.38mm in difference from previous imaging.

## 2023-12-01 ENCOUNTER — APPOINTMENT (OUTPATIENT)
Dept: URBAN - METROPOLITAN AREA CLINIC 306 | Age: 78
Setting detail: DERMATOLOGY
End: 2023-12-04

## 2023-12-01 PROBLEM — C44.01 BASAL CELL CARCINOMA OF SKIN OF LIP: Status: ACTIVE | Noted: 2023-12-01

## 2023-12-01 PROCEDURE — 77280 THER RAD SIMULAJ FIELD SMPL: CPT

## 2023-12-01 PROCEDURE — OTHER IMAGE GUIDED - SUPERFICIAL RADIOTHERAPY: TREATMENT VISIT: OTHER

## 2023-12-01 PROCEDURE — OTHER IMAGE GUIDED - SUPERFICIAL RADIOTHERAPY: OTHER

## 2023-12-01 PROCEDURE — 77401 RADIATION TX DELIVERY SUPFC: CPT

## 2023-12-01 PROCEDURE — G6001 ECHO GUIDANCE RADIOTHERAPY: HCPCS | Mod: XU

## 2023-12-01 PROCEDURE — OTHER DIAGNOSIS COMMENT: OTHER

## 2023-12-01 NOTE — PROCEDURE: IMAGE GUIDED - SUPERFICIAL RADIOTHERAPY: TREATMENT VISIT
Ultrasound Not Used Text: Ultrasound was not performed today due to
Additional Change To Daily Dosage Administered Mid Treatment?: No
Fraction Number: 15
Energy (Kv): 70
Bill For Set Radiation Therapy Field (25779)?: Yes
Treatment Documentation: Physician Orders: Radiotherapy Treatment Plan: Superficial Radiotherapy with Ultrasound\\nPlan: This patient has been treated today with image-guided superficial radiation therapy for non-melanoma skin cancer.  Written informed consent has been previously obtained from this patient for this treatment. This consent is documented in the patient's chart. The patient gave verbal consent to continue treatment today.\\nThe patient was treated with a specific radiation dose and setup as prescribed by the provider listed on this visit note. A Radiation Therapist performed administration of radiation under the supervision of a provider.\\nThe treatment parameters and cumulative dose are indicated above. Prior to administering the radiation, the patient underwent a verification therapeutic radiology simulation-aided field setting defining relevant normal and abnormal target anatomy and acquiring images with a separate and distinct diagnostic high-frequency ultrasound to delineate tissues and determine whether to proceed with delivery of therapeutic, in addition to retrieve data necessary to develop an optimal radiation treatment process for the patient. The field placement simulation documents any change seen in overall tumor volume documented in the patient’s record, allows the clinician to indicate any needed changes in the treatment plan and/or prescription, provides diagnostic evaluation as the basis for performing the therapeutic procedure, and clearly identifies the information needed to decide to proceed with the therapeutic procedure. This process includes verification of the treatment port(s) and proper treatment positioning. All treatment ports were photographed within electronic medical records. The patient's lead blocking along with gross tumor volume and margin was confirmed. Considering superficial radiotherapy is clinical in setup, this requires the physician and radiation therapist to clarify the location interest being treated against initial images, ultrasound, pathology, and patient anatomy. Care was taken to ensure dawson treated were geometrically accurate and properly positioned using therapeutic radiology simulation-aided field setting verification per fraction. This process is also utilized to determine if any prescription or setup changes are necessary. These steps are therefore medically necessary to ensure safe and effective administration of radiation. Ongoing therapeutic radiology simulation-aided field setting verification is ordered throughout the course of therapy.  A high-frequency ultrasound image was acquired today for a two-dimensional evaluation of the tumor volume, depth, width, breadth, review of prior response to treatment, provide geometric accuracy of field placement, and determine whether to proceed with therapeutic delivery.\\nThe field placement and ultrasound imaging, per fraction, is separate and distinct from the initial simulation and is an important task in providing safe administration of superficial radiation therapy. Physician evaluation of the ultrasound information will be ongoing throughout the course of treatment and is deemed medically necessary to ensure the efficacy of treatment, whether to proceed with therapeutic delivery, and determine any necessary changes. Today's images were evaluated for determination of continuation of treatment with the current plan or with necessary changes as appropriate. According to the review of verification therapeutic radiology simulation-aided field setting and imaging, no change is required.\\nAdditionally, the use of ultrasound visualization and targeted assessment allows the patient to be able to see her cancer progress, encouraging the patient to complete and maintain compliance through the full course of prescribed radiotherapy. Per Dr. Garrett Roberts, continued ultrasound guidance and therapeutic radiology simulation-aided field setting verification per fraction is required for field placement, measurement of tumor depth, tissues evaluation, progress, acute effect monitoring, and determination for therapeutic treatment delivery is appropriate.
Daily Dosage (Cgy): 270.06
Additional Comments (Add Customization Of Note Here): Treatment site is dry and flaky today.  Confirmed she is using Aquaphor regularly.
Total Cumulative Dose (Cgy): 4050.90
Add X Modifier?: VIEYRA - Unusual Non-Overlapping Service
Calculate Total Cumulative Dose Automatically Or Manually: Manually
Prescription Used: 1
Ultrasound Used Text: High frequency ultrasound depth is 01.34mm, which is +0.04mm in difference from previous imaging.

## 2023-12-05 ENCOUNTER — APPOINTMENT (OUTPATIENT)
Dept: URBAN - METROPOLITAN AREA CLINIC 306 | Age: 78
Setting detail: DERMATOLOGY
End: 2023-12-05

## 2023-12-05 PROBLEM — C44.01 BASAL CELL CARCINOMA OF SKIN OF LIP: Status: ACTIVE | Noted: 2023-12-05

## 2023-12-05 PROCEDURE — 77427 RADIATION TX MANAGEMENT X5: CPT

## 2023-12-05 PROCEDURE — 77280 THER RAD SIMULAJ FIELD SMPL: CPT

## 2023-12-05 PROCEDURE — OTHER IMAGE GUIDED - SUPERFICIAL RADIOTHERAPY: TREATMENT VISIT: OTHER

## 2023-12-05 PROCEDURE — OTHER DIAGNOSIS COMMENT: OTHER

## 2023-12-05 PROCEDURE — G6001 ECHO GUIDANCE RADIOTHERAPY: HCPCS | Mod: XU

## 2023-12-05 PROCEDURE — OTHER IMAGE GUIDED - SUPERFICIAL RADIOTHERAPY: OTHER

## 2023-12-05 PROCEDURE — 77401 RADIATION TX DELIVERY SUPFC: CPT

## 2023-12-05 PROCEDURE — OTHER IMAGE GUIDED - SUPERFICIAL RADIOTHERAPY: EVALUATION VISIT: OTHER

## 2023-12-05 NOTE — PROCEDURE: IMAGE GUIDED - SUPERFICIAL RADIOTHERAPY: EVALUATION VISIT
Radiation Therapy Oncology Group (Rtog) Score: 1
Evaluation Plan: Physician Orders: Plan: On Treatment Visit (OTV) with Ultrasound\\n\\nPlan: The patient is undergoing superficial radiation therapy for skin cancer and presents for weekly evaluation and management. Per protocol and as documented on the flow sheet, the patient was questioned as to subjective redness, pruritus, pain, drainage, fatigue, or any other symptoms. Objectively, the radiation area was evaluated with regards to erythema, atrophy, scale, crusting, erosion, ulceration, edema, purpura, tenderness, warmth, drainage, and any other findings. The plan was extensively reviewed including dose and dosing schedule. The simulation and clinical setup were also reviewed as were external and any internal shields and based on this review the appropriateness and sufficiency of treatment was determined.\\n\\nA high-frequency ultrasound image was acquired today for a two-dimensional evaluation of the tumor volume, depth, width, breadth, review of prior response to treatment, provide geometric accuracy of field placement, and determine whether to proceed with therapeutic delivery.\\n\\nPer Dr. Garrett Roberts, continued ultrasound guidance and therapeutic radiology simulation-aided field setting\\n\\nverification per fraction is required for field placement, measurement of tumor depth, tissues evaluation, progress, acute effect monitoring, and determination for therapeutic treatment delivery is appropriate.
Ultrasound Used Text: Ultrasound depth is mm.
Additional Comments (Add Customization Of Note Here): Patient states no pain or tenderness. Ultrasound shows healthy cell repopulation.
Was Ultrasound Performed Today?: No
Ultrasound Not Used Text: Ultrasound was not performed today due to
Is This Visit For Evaluation During Treatment Or Follow Up Post Treatment?: evaluation
Bill For Evaluation (78067)?: Yes
Assessment: Appropriate reaction

## 2023-12-05 NOTE — PROCEDURE: IMAGE GUIDED - SUPERFICIAL RADIOTHERAPY: TREATMENT VISIT
Ultrasound Not Used Text: Ultrasound was not performed today due to
Additional Change To Daily Dosage Administered Mid Treatment?: No
Fraction Number: 16
Energy (Kv): 70
Bill For Set Radiation Therapy Field (93179)?: Yes
Treatment Documentation: Physician Orders: Radiotherapy Treatment Plan: Superficial Radiotherapy with Ultrasound\\nPlan: This patient has been treated today with image-guided superficial radiation therapy for non-melanoma skin cancer.  Written informed consent has been previously obtained from this patient for this treatment. This consent is documented in the patient's chart. The patient gave verbal consent to continue treatment today.\\nThe patient was treated with a specific radiation dose and setup as prescribed by the provider listed on this visit note. A Radiation Therapist performed administration of radiation under the supervision of a provider.\\nThe treatment parameters and cumulative dose are indicated above. Prior to administering the radiation, the patient underwent a verification therapeutic radiology simulation-aided field setting defining relevant normal and abnormal target anatomy and acquiring images with a separate and distinct diagnostic high-frequency ultrasound to delineate tissues and determine whether to proceed with delivery of therapeutic, in addition to retrieve data necessary to develop an optimal radiation treatment process for the patient. The field placement simulation documents any change seen in overall tumor volume documented in the patient’s record, allows the clinician to indicate any needed changes in the treatment plan and/or prescription, provides diagnostic evaluation as the basis for performing the therapeutic procedure, and clearly identifies the information needed to decide to proceed with the therapeutic procedure. This process includes verification of the treatment port(s) and proper treatment positioning. All treatment ports were photographed within electronic medical records. The patient's lead blocking along with gross tumor volume and margin was confirmed. Considering superficial radiotherapy is clinical in setup, this requires the physician and radiation therapist to clarify the location interest being treated against initial images, ultrasound, pathology, and patient anatomy. Care was taken to ensure dawson treated were geometrically accurate and properly positioned using therapeutic radiology simulation-aided field setting verification per fraction. This process is also utilized to determine if any prescription or setup changes are necessary. These steps are therefore medically necessary to ensure safe and effective administration of radiation. Ongoing therapeutic radiology simulation-aided field setting verification is ordered throughout the course of therapy.  A high-frequency ultrasound image was acquired today for a two-dimensional evaluation of the tumor volume, depth, width, breadth, review of prior response to treatment, provide geometric accuracy of field placement, and determine whether to proceed with therapeutic delivery.\\nThe field placement and ultrasound imaging, per fraction, is separate and distinct from the initial simulation and is an important task in providing safe administration of superficial radiation therapy. Physician evaluation of the ultrasound information will be ongoing throughout the course of treatment and is deemed medically necessary to ensure the efficacy of treatment, whether to proceed with therapeutic delivery, and determine any necessary changes. Today's images were evaluated for determination of continuation of treatment with the current plan or with necessary changes as appropriate. According to the review of verification therapeutic radiology simulation-aided field setting and imaging, no change is required.\\nAdditionally, the use of ultrasound visualization and targeted assessment allows the patient to be able to see her cancer progress, encouraging the patient to complete and maintain compliance through the full course of prescribed radiotherapy. Per Dr. Garrett Roberts, continued ultrasound guidance and therapeutic radiology simulation-aided field setting verification per fraction is required for field placement, measurement of tumor depth, tissues evaluation, progress, acute effect monitoring, and determination for therapeutic treatment delivery is appropriate.
Daily Dosage (Cgy): 270.06
Total Cumulative Dose (Cgy): 4320.96
Add X Modifier?: VIEYRA - Unusual Non-Overlapping Service
Calculate Total Cumulative Dose Automatically Or Manually: Manually
Prescription Used: 1
Ultrasound Used Text: High frequency ultrasound depth is 01.29mm, which is +0.05mm in difference from previous imaging.

## 2023-12-06 ENCOUNTER — APPOINTMENT (OUTPATIENT)
Dept: URBAN - METROPOLITAN AREA CLINIC 306 | Age: 78
Setting detail: DERMATOLOGY
End: 2023-12-07

## 2023-12-06 PROBLEM — C44.01 BASAL CELL CARCINOMA OF SKIN OF LIP: Status: ACTIVE | Noted: 2023-12-06

## 2023-12-06 PROCEDURE — 77280 THER RAD SIMULAJ FIELD SMPL: CPT

## 2023-12-06 PROCEDURE — OTHER DIAGNOSIS COMMENT: OTHER

## 2023-12-06 PROCEDURE — G6001 ECHO GUIDANCE RADIOTHERAPY: HCPCS | Mod: XU

## 2023-12-06 PROCEDURE — OTHER IMAGE GUIDED - SUPERFICIAL RADIOTHERAPY: TREATMENT VISIT: OTHER

## 2023-12-06 PROCEDURE — 77401 RADIATION TX DELIVERY SUPFC: CPT

## 2023-12-06 PROCEDURE — OTHER IMAGE GUIDED - SUPERFICIAL RADIOTHERAPY: OTHER

## 2023-12-06 NOTE — PROCEDURE: IMAGE GUIDED - SUPERFICIAL RADIOTHERAPY: TREATMENT VISIT
Ultrasound Not Used Text: Ultrasound was not performed today due to
Additional Change To Daily Dosage Administered Mid Treatment?: No
Fraction Number: 17
Energy (Kv): 70
Bill For Set Radiation Therapy Field (98236)?: Yes
Treatment Documentation: Physician Orders: Radiotherapy Treatment Plan: Superficial Radiotherapy with Ultrasound\\nPlan: This patient has been treated today with image-guided superficial radiation therapy for non-melanoma skin cancer.  Written informed consent has been previously obtained from this patient for this treatment. This consent is documented in the patient's chart. The patient gave verbal consent to continue treatment today.\\nThe patient was treated with a specific radiation dose and setup as prescribed by the provider listed on this visit note. A Radiation Therapist performed administration of radiation under the supervision of a provider.\\nThe treatment parameters and cumulative dose are indicated above. Prior to administering the radiation, the patient underwent a verification therapeutic radiology simulation-aided field setting defining relevant normal and abnormal target anatomy and acquiring images with a separate and distinct diagnostic high-frequency ultrasound to delineate tissues and determine whether to proceed with delivery of therapeutic, in addition to retrieve data necessary to develop an optimal radiation treatment process for the patient. The field placement simulation documents any change seen in overall tumor volume documented in the patient’s record, allows the clinician to indicate any needed changes in the treatment plan and/or prescription, provides diagnostic evaluation as the basis for performing the therapeutic procedure, and clearly identifies the information needed to decide to proceed with the therapeutic procedure. This process includes verification of the treatment port(s) and proper treatment positioning. All treatment ports were photographed within electronic medical records. The patient's lead blocking along with gross tumor volume and margin was confirmed. Considering superficial radiotherapy is clinical in setup, this requires the physician and radiation therapist to clarify the location interest being treated against initial images, ultrasound, pathology, and patient anatomy. Care was taken to ensure dawson treated were geometrically accurate and properly positioned using therapeutic radiology simulation-aided field setting verification per fraction. This process is also utilized to determine if any prescription or setup changes are necessary. These steps are therefore medically necessary to ensure safe and effective administration of radiation. Ongoing therapeutic radiology simulation-aided field setting verification is ordered throughout the course of therapy.  A high-frequency ultrasound image was acquired today for a two-dimensional evaluation of the tumor volume, depth, width, breadth, review of prior response to treatment, provide geometric accuracy of field placement, and determine whether to proceed with therapeutic delivery.\\nThe field placement and ultrasound imaging, per fraction, is separate and distinct from the initial simulation and is an important task in providing safe administration of superficial radiation therapy. Physician evaluation of the ultrasound information will be ongoing throughout the course of treatment and is deemed medically necessary to ensure the efficacy of treatment, whether to proceed with therapeutic delivery, and determine any necessary changes. Today's images were evaluated for determination of continuation of treatment with the current plan or with necessary changes as appropriate. According to the review of verification therapeutic radiology simulation-aided field setting and imaging, no change is required.\\nAdditionally, the use of ultrasound visualization and targeted assessment allows the patient to be able to see her cancer progress, encouraging the patient to complete and maintain compliance through the full course of prescribed radiotherapy. Per Dr. Garrett Roberts, continued ultrasound guidance and therapeutic radiology simulation-aided field setting verification per fraction is required for field placement, measurement of tumor depth, tissues evaluation, progress, acute effect monitoring, and determination for therapeutic treatment delivery is appropriate.
Daily Dosage (Cgy): 270.06
Additional Comments (Add Customization Of Note Here): LIp is healing nicely.   Slight tingling still, but nothing intolerable.
Total Cumulative Dose (Cgy): 4591.02
Add X Modifier?: VIEYRA - Unusual Non-Overlapping Service
Calculate Total Cumulative Dose Automatically Or Manually: Manually
Prescription Used: 1
Ultrasound Used Text: High frequency ultrasound depth is 0.92mm, which is -0.37 mm in difference from previous imaging.

## 2023-12-07 ENCOUNTER — APPOINTMENT (OUTPATIENT)
Dept: URBAN - METROPOLITAN AREA CLINIC 306 | Age: 78
Setting detail: DERMATOLOGY
End: 2023-12-11

## 2023-12-07 PROBLEM — C44.01 BASAL CELL CARCINOMA OF SKIN OF LIP: Status: ACTIVE | Noted: 2023-12-07

## 2023-12-07 PROCEDURE — OTHER DIAGNOSIS COMMENT: OTHER

## 2023-12-07 PROCEDURE — OTHER IMAGE GUIDED - SUPERFICIAL RADIOTHERAPY: OTHER

## 2023-12-07 PROCEDURE — 77280 THER RAD SIMULAJ FIELD SMPL: CPT

## 2023-12-07 PROCEDURE — G6001 ECHO GUIDANCE RADIOTHERAPY: HCPCS | Mod: XU

## 2023-12-07 PROCEDURE — OTHER IMAGE GUIDED - SUPERFICIAL RADIOTHERAPY: TREATMENT VISIT: OTHER

## 2023-12-07 PROCEDURE — 77401 RADIATION TX DELIVERY SUPFC: CPT

## 2023-12-07 NOTE — PROCEDURE: IMAGE GUIDED - SUPERFICIAL RADIOTHERAPY: TREATMENT VISIT
Ultrasound Not Used Text: Ultrasound was not performed today due to
Additional Change To Daily Dosage Administered Mid Treatment?: No
Fraction Number: 18
Energy (Kv): 70
Bill For Set Radiation Therapy Field ([id number])?: Yes
Treatment Documentation: Physician Orders: Radiotherapy Treatment Plan: Superficial Radiotherapy with Ultrasound\\nPlan: This patient has been treated today with image-guided superficial radiation therapy for non-melanoma skin cancer.  Written informed consent has been previously obtained from this patient for this treatment. This consent is documented in the patient's chart. The patient gave verbal consent to continue treatment today.\\nThe patient was treated with a specific radiation dose and setup as prescribed by the provider listed on this visit note. A Radiation Therapist performed administration of radiation under the supervision of a provider.\\nThe treatment parameters and cumulative dose are indicated above. Prior to administering the radiation, the patient underwent a verification therapeutic radiology simulation-aided field setting defining relevant normal and abnormal target anatomy and acquiring images with a separate and distinct diagnostic high-frequency ultrasound to delineate tissues and determine whether to proceed with delivery of therapeutic, in addition to retrieve data necessary to develop an optimal radiation treatment process for the patient. The field placement simulation documents any change seen in overall tumor volume documented in the patient’s record, allows the clinician to indicate any needed changes in the treatment plan and/or prescription, provides diagnostic evaluation as the basis for performing the therapeutic procedure, and clearly identifies the information needed to decide to proceed with the therapeutic procedure. This process includes verification of the treatment port(s) and proper treatment positioning. All treatment ports were photographed within electronic medical records. The patient's lead blocking along with gross tumor volume and margin was confirmed. Considering superficial radiotherapy is clinical in setup, this requires the physician and radiation therapist to clarify the location interest being treated against initial images, ultrasound, pathology, and patient anatomy. Care was taken to ensure dawson treated were geometrically accurate and properly positioned using therapeutic radiology simulation-aided field setting verification per fraction. This process is also utilized to determine if any prescription or setup changes are necessary. These steps are therefore medically necessary to ensure safe and effective administration of radiation. Ongoing therapeutic radiology simulation-aided field setting verification is ordered throughout the course of therapy.  A high-frequency ultrasound image was acquired today for a two-dimensional evaluation of the tumor volume, depth, width, breadth, review of prior response to treatment, provide geometric accuracy of field placement, and determine whether to proceed with therapeutic delivery.\\nThe field placement and ultrasound imaging, per fraction, is separate and distinct from the initial simulation and is an important task in providing safe administration of superficial radiation therapy. Physician evaluation of the ultrasound information will be ongoing throughout the course of treatment and is deemed medically necessary to ensure the efficacy of treatment, whether to proceed with therapeutic delivery, and determine any necessary changes. Today's images were evaluated for determination of continuation of treatment with the current plan or with necessary changes as appropriate. According to the review of verification therapeutic radiology simulation-aided field setting and imaging, no change is required.\\nAdditionally, the use of ultrasound visualization and targeted assessment allows the patient to be able to see her cancer progress, encouraging the patient to complete and maintain compliance through the full course of prescribed radiotherapy. Per Dr. Garrett Roberts, continued ultrasound guidance and therapeutic radiology simulation-aided field setting verification per fraction is required for field placement, measurement of tumor depth, tissues evaluation, progress, acute effect monitoring, and determination for therapeutic treatment delivery is appropriate.
Daily Dosage (Cgy): 270.06
Additional Comments (Add Customization Of Note Here): Slight scaling noted.  No erythema though.
Total Cumulative Dose (Cgy): 4861.08
Add X Modifier?: VIEYRA - Unusual Non-Overlapping Service
Calculate Total Cumulative Dose Automatically Or Manually: Manually
Prescription Used: 1
Ultrasound Used Text: High frequency ultrasound depth is 0.91mm, which is -0.01 mm in difference from previous imaging.

## 2023-12-10 ENCOUNTER — APPOINTMENT (OUTPATIENT)
Dept: URBAN - METROPOLITAN AREA CLINIC 306 | Age: 78
Setting detail: DERMATOLOGY
End: 2024-01-04

## 2023-12-10 PROBLEM — C44.01 BASAL CELL CARCINOMA OF SKIN OF LIP: Status: ACTIVE | Noted: 2023-12-10

## 2023-12-10 PROCEDURE — OTHER IMAGE GUIDED - SUPERFICIAL RADIOTHERAPY: OTHER

## 2023-12-10 PROCEDURE — 77336 RADIATION PHYSICS CONSULT: CPT

## 2023-12-10 NOTE — PROCEDURE: IMAGE GUIDED - SUPERFICIAL RADIOTHERAPY
Detail Level: Detailed
Field Number: 1
Lesion Dimensions-X Axis In Cm: 0.5
Shield Size In Cm: 1.0 X 1.0
Applicator Size In Cm: 1.5 cm
Energy (Kv): 70
Treatment Time (Min): 0.42
Time, Dose, Fractionation Factor (Tdf) For Prescription 1: 95
Daily Dose (Cgy): 270.06
Number Of Fractions For Prescription 1: 20
Treatments Per Week: 3
Total Dose For Prescription 1 (Cgy): 5401.20
Add A Second Prescription?: No
Additional Fraction(S) Needed:: 0
Add Physics Consultation: Yes
Physics Consultation Performed For Fractions:: 13-18
Physics Documentation: PHYSICS:\\nPhysician Orders: Plan: Medical Physics Checks: Per the request of Dr. Garrett Roberts, continuing medical physics review as per radiotherapy standard of care post every 5th fraction for patient, including assessment of treatment parameters,  of dose delivery, and review of patient treatment documentation in support of the provider, to ensure efficacy and continued safe delivery of radiotherapy. Included in physics check is review of patient setup information, all pertinent simulation and treatment photographs checks, prescription, dose calculation verification, per fraction dose charted correctly, elapsed days and treatment days correctly charted, cumulative dose correct, and review of any prescription changes. Patient was not present, nor was it necessary for the patient to be present for weekly physics review and no other superficial radiotherapy services were rendered on this day. Continued medical physics review post every 5th fraction of therapy is requested by provider for appropriate radiotherapy management and is deemed medically necessary and standard of care.

## 2023-12-11 ENCOUNTER — APPOINTMENT (OUTPATIENT)
Dept: URBAN - METROPOLITAN AREA CLINIC 306 | Age: 78
Setting detail: DERMATOLOGY
End: 2023-12-11

## 2023-12-11 PROBLEM — C44.01 BASAL CELL CARCINOMA OF SKIN OF LIP: Status: ACTIVE | Noted: 2023-12-11

## 2023-12-11 PROCEDURE — 77401 RADIATION TX DELIVERY SUPFC: CPT

## 2023-12-11 PROCEDURE — OTHER IMAGE GUIDED - SUPERFICIAL RADIOTHERAPY: OTHER

## 2023-12-11 PROCEDURE — OTHER DIAGNOSIS COMMENT: OTHER

## 2023-12-11 PROCEDURE — OTHER IMAGE GUIDED - SUPERFICIAL RADIOTHERAPY: TREATMENT VISIT: OTHER

## 2023-12-11 PROCEDURE — 77280 THER RAD SIMULAJ FIELD SMPL: CPT

## 2023-12-11 PROCEDURE — G6001 ECHO GUIDANCE RADIOTHERAPY: HCPCS | Mod: XU

## 2023-12-11 NOTE — PROCEDURE: IMAGE GUIDED - SUPERFICIAL RADIOTHERAPY: TREATMENT VISIT
After evaluating the patient it has been determined they are at risk for postpartum hemorrhage.
Ultrasound Not Used Text: Ultrasound was not performed today due to
Additional Change To Daily Dosage Administered Mid Treatment?: No
Fraction Number: 19
Energy (Kv): 70
Bill For Set Radiation Therapy Field (54559)?: Yes
Treatment Documentation: Physician Orders: Radiotherapy Treatment Plan: Superficial Radiotherapy with Ultrasound\\nPlan: This patient has been treated today with image-guided superficial radiation therapy for non-melanoma skin cancer.  Written informed consent has been previously obtained from this patient for this treatment. This consent is documented in the patient's chart. The patient gave verbal consent to continue treatment today.\\nThe patient was treated with a specific radiation dose and setup as prescribed by the provider listed on this visit note. A Radiation Therapist performed administration of radiation under the supervision of a provider.\\nThe treatment parameters and cumulative dose are indicated above. Prior to administering the radiation, the patient underwent a verification therapeutic radiology simulation-aided field setting defining relevant normal and abnormal target anatomy and acquiring images with a separate and distinct diagnostic high-frequency ultrasound to delineate tissues and determine whether to proceed with delivery of therapeutic, in addition to retrieve data necessary to develop an optimal radiation treatment process for the patient. The field placement simulation documents any change seen in overall tumor volume documented in the patient’s record, allows the clinician to indicate any needed changes in the treatment plan and/or prescription, provides diagnostic evaluation as the basis for performing the therapeutic procedure, and clearly identifies the information needed to decide to proceed with the therapeutic procedure. This process includes verification of the treatment port(s) and proper treatment positioning. All treatment ports were photographed within electronic medical records. The patient's lead blocking along with gross tumor volume and margin was confirmed. Considering superficial radiotherapy is clinical in setup, this requires the physician and radiation therapist to clarify the location interest being treated against initial images, ultrasound, pathology, and patient anatomy. Care was taken to ensure dawson treated were geometrically accurate and properly positioned using therapeutic radiology simulation-aided field setting verification per fraction. This process is also utilized to determine if any prescription or setup changes are necessary. These steps are therefore medically necessary to ensure safe and effective administration of radiation. Ongoing therapeutic radiology simulation-aided field setting verification is ordered throughout the course of therapy.  A high-frequency ultrasound image was acquired today for a two-dimensional evaluation of the tumor volume, depth, width, breadth, review of prior response to treatment, provide geometric accuracy of field placement, and determine whether to proceed with therapeutic delivery.\\nThe field placement and ultrasound imaging, per fraction, is separate and distinct from the initial simulation and is an important task in providing safe administration of superficial radiation therapy. Physician evaluation of the ultrasound information will be ongoing throughout the course of treatment and is deemed medically necessary to ensure the efficacy of treatment, whether to proceed with therapeutic delivery, and determine any necessary changes. Today's images were evaluated for determination of continuation of treatment with the current plan or with necessary changes as appropriate. According to the review of verification therapeutic radiology simulation-aided field setting and imaging, no change is required.\\nAdditionally, the use of ultrasound visualization and targeted assessment allows the patient to be able to see her cancer progress, encouraging the patient to complete and maintain compliance through the full course of prescribed radiotherapy. Per Dr. Garrett Roberts, continued ultrasound guidance and therapeutic radiology simulation-aided field setting verification per fraction is required for field placement, measurement of tumor depth, tissues evaluation, progress, acute effect monitoring, and determination for therapeutic treatment delivery is appropriate.
Daily Dosage (Cgy): 270.06
Additional Comments (Add Customization Of Note Here): Moderate erythema today.  No dryness or scaling today.  No tenderness noted.
Total Cumulative Dose (Cgy): 5131.14
Add X Modifier?: VIEYRA - Unusual Non-Overlapping Service
Calculate Total Cumulative Dose Automatically Or Manually: Manually
Prescription Used: 1
Ultrasound Used Text: High frequency ultrasound depth is 0.83 mm, which is -0.08 mm in difference from previous imaging.

## 2023-12-12 ENCOUNTER — APPOINTMENT (OUTPATIENT)
Dept: URBAN - METROPOLITAN AREA CLINIC 306 | Age: 78
Setting detail: DERMATOLOGY
End: 2023-12-12

## 2023-12-12 PROBLEM — C44.01 BASAL CELL CARCINOMA OF SKIN OF LIP: Status: ACTIVE | Noted: 2023-12-12

## 2023-12-12 PROCEDURE — OTHER IMAGE GUIDED - SUPERFICIAL RADIOTHERAPY: TREATMENT VISIT: OTHER

## 2023-12-12 PROCEDURE — OTHER IMAGE GUIDED - SUPERFICIAL RADIOTHERAPY: OTHER

## 2023-12-12 PROCEDURE — 77401 RADIATION TX DELIVERY SUPFC: CPT

## 2023-12-12 PROCEDURE — 77280 THER RAD SIMULAJ FIELD SMPL: CPT

## 2023-12-12 PROCEDURE — OTHER DIAGNOSIS COMMENT: OTHER

## 2023-12-12 PROCEDURE — G6001 ECHO GUIDANCE RADIOTHERAPY: HCPCS | Mod: XU

## 2023-12-12 PROCEDURE — OTHER IMAGE GUIDED - SUPERFICIAL RADIOTHERAPY: EVALUATION VISIT: OTHER

## 2023-12-12 PROCEDURE — 77427 RADIATION TX MANAGEMENT X5: CPT

## 2023-12-12 NOTE — PROCEDURE: IMAGE GUIDED - SUPERFICIAL RADIOTHERAPY: EVALUATION VISIT
Ultrasound Used Text: Ultrasound depth is 0.78 mm.
Assessment: Appropriate reaction
Evaluation Plan: Physician Orders: Plan: On Treatment Visit (OTV) with Ultrasound\\n\\nPlan: The patient is undergoing superficial radiation therapy for skin cancer and presents for weekly evaluation and management. Per protocol and as documented on the flow sheet, the patient was questioned as to subjective redness, pruritus, pain, drainage, fatigue, or any other symptoms. Objectively, the radiation area was evaluated with regards to erythema, atrophy, scale, crusting, erosion, ulceration, edema, purpura, tenderness, warmth, drainage, and any other findings. The plan was extensively reviewed including dose and dosing schedule. The simulation and clinical setup were also reviewed as were external and any internal shields and based on this review the appropriateness and sufficiency of treatment was determined.\\nA high-frequency ultrasound image was acquired today for a two-dimensional evaluation of the tumor volume, depth, width, breadth, review of prior response to treatment, provide geometric accuracy of field placement, and determine whether to proceed with therapeutic delivery.\\nPer Dr. Garrett Roberts, continued ultrasound guidance and therapeutic radiology simulation-aided field setting verification per fraction is required for field placement, measurement of tumor depth, tissues evaluation, progress, acute effect monitoring, and determination for therapeutic treatment delivery is appropriate.
Radiation Therapy Oncology Group (Rtog) Score: 1
Ultrasound Not Used Text: Ultrasound was not performed today due to
Additional Comments (Add Customization Of Note Here): Follow up in 4 months for a full body skin check.  Follow up 6 weeks for post SRT. 1-25-24
Bill For Ultrasound Evaluation (): Yes
Is This Visit For Evaluation During Treatment Or Follow Up Post Treatment?: evaluation

## 2023-12-12 NOTE — PROCEDURE: IMAGE GUIDED - SUPERFICIAL RADIOTHERAPY: TREATMENT VISIT
Ultrasound Not Used Text: Ultrasound was not performed today due to
Additional Change To Daily Dosage Administered Mid Treatment?: No
Fraction Number: 20
Energy (Kv): 70
Bill For Set Radiation Therapy Field (84184)?: Yes
Treatment Documentation: Physician Orders: Radiotherapy Treatment Plan: Superficial Radiotherapy with Ultrasound\\nPlan: This patient has been treated today with image-guided superficial radiation therapy for non-melanoma skin cancer.  Written informed consent has been previously obtained from this patient for this treatment. This consent is documented in the patient's chart. The patient gave verbal consent to continue treatment today.\\nThe patient was treated with a specific radiation dose and setup as prescribed by the provider listed on this visit note. A Radiation Therapist performed administration of radiation under the supervision of a provider.\\nThe treatment parameters and cumulative dose are indicated above. Prior to administering the radiation, the patient underwent a verification therapeutic radiology simulation-aided field setting defining relevant normal and abnormal target anatomy and acquiring images with a separate and distinct diagnostic high-frequency ultrasound to delineate tissues and determine whether to proceed with delivery of therapeutic, in addition to retrieve data necessary to develop an optimal radiation treatment process for the patient. The field placement simulation documents any change seen in overall tumor volume documented in the patient’s record, allows the clinician to indicate any needed changes in the treatment plan and/or prescription, provides diagnostic evaluation as the basis for performing the therapeutic procedure, and clearly identifies the information needed to decide to proceed with the therapeutic procedure. This process includes verification of the treatment port(s) and proper treatment positioning. All treatment ports were photographed within electronic medical records. The patient's lead blocking along with gross tumor volume and margin was confirmed. Considering superficial radiotherapy is clinical in setup, this requires the physician and radiation therapist to clarify the location interest being treated against initial images, ultrasound, pathology, and patient anatomy. Care was taken to ensure dawson treated were geometrically accurate and properly positioned using therapeutic radiology simulation-aided field setting verification per fraction. This process is also utilized to determine if any prescription or setup changes are necessary. These steps are therefore medically necessary to ensure safe and effective administration of radiation. Ongoing therapeutic radiology simulation-aided field setting verification is ordered throughout the course of therapy.  A high-frequency ultrasound image was acquired today for a two-dimensional evaluation of the tumor volume, depth, width, breadth, review of prior response to treatment, provide geometric accuracy of field placement, and determine whether to proceed with therapeutic delivery.\\nThe field placement and ultrasound imaging, per fraction, is separate and distinct from the initial simulation and is an important task in providing safe administration of superficial radiation therapy. Physician evaluation of the ultrasound information will be ongoing throughout the course of treatment and is deemed medically necessary to ensure the efficacy of treatment, whether to proceed with therapeutic delivery, and determine any necessary changes. Today's images were evaluated for determination of continuation of treatment with the current plan or with necessary changes as appropriate. According to the review of verification therapeutic radiology simulation-aided field setting and imaging, no change is required.\\nAdditionally, the use of ultrasound visualization and targeted assessment allows the patient to be able to see her cancer progress, encouraging the patient to complete and maintain compliance through the full course of prescribed radiotherapy. Per Dr. Garrett Roberts, continued ultrasound guidance and therapeutic radiology simulation-aided field setting verification per fraction is required for field placement, measurement of tumor depth, tissues evaluation, progress, acute effect monitoring, and determination for therapeutic treatment delivery is appropriate.
Daily Dosage (Cgy): 270.06
Additional Comments (Add Customization Of Note Here): Final treatment today.  Skin is moderately erythemic.  No tenderness.  Pt to follow up for a 6 week follow up.1-25-24.
Total Cumulative Dose (Cgy): 5401.20
Add X Modifier?: VIEYRA - Unusual Non-Overlapping Service
Calculate Total Cumulative Dose Automatically Or Manually: Manually
Prescription Used: 1
Ultrasound Used Text: High frequency ultrasound depth is 0.78 mm, which is -0.05mm in difference from previous imaging.

## 2023-12-12 NOTE — PROCEDURE: DIAGNOSIS COMMENT
Render Risk Assessment In Note?: no
Comment: Pathology showed a basaloid proliferation that the pathologist thought was most likely BCC.
Detail Level: Simple
all other ROS negative except as per HPI

## 2023-12-16 ENCOUNTER — APPOINTMENT (OUTPATIENT)
Dept: URBAN - METROPOLITAN AREA CLINIC 306 | Age: 78
Setting detail: DERMATOLOGY
End: 2024-01-04

## 2023-12-16 PROBLEM — C44.01 BASAL CELL CARCINOMA OF SKIN OF LIP: Status: ACTIVE | Noted: 2023-12-16

## 2023-12-16 PROCEDURE — 77336 RADIATION PHYSICS CONSULT: CPT

## 2023-12-16 PROCEDURE — OTHER IMAGE GUIDED - SUPERFICIAL RADIOTHERAPY: OTHER

## 2023-12-16 NOTE — PROCEDURE: IMAGE GUIDED - SUPERFICIAL RADIOTHERAPY
Detail Level: Detailed
Field Number: 1
Lesion Dimensions-X Axis In Cm: 0.5
Shield Size In Cm: 1.0 X 1.0
Applicator Size In Cm: 1.5 cm
Energy (Kv): 70
Treatment Time (Min): 0.42
Time, Dose, Fractionation Factor (Tdf) For Prescription 1: 95
Daily Dose (Cgy): 270.06
Number Of Fractions For Prescription 1: 20
Treatments Per Week: 3
Total Dose For Prescription 1 (Cgy): 5401.20
Add A Second Prescription?: No
Additional Fraction(S) Needed:: 0
Add Physics Consultation: Yes
Physics Consultation Performed For Fractions:: 19-20
Physics Documentation: PHYSICS:\\nPhysician Orders: Plan: Medical Physics Checks: Per the request of Dr. Garrett Roberts, continuing medical physics review as per radiotherapy standard of care post every 5th fraction for patient, including assessment of treatment parameters,  of dose delivery, and review of patient treatment documentation in support of the provider, to ensure efficacy and continued safe delivery of radiotherapy. Included in physics check is review of patient setup information, all pertinent simulation and treatment photographs checks, prescription, dose calculation verification, per fraction dose charted correctly, elapsed days and treatment days correctly charted, cumulative dose correct, and review of any prescription changes. Patient was not present, nor was it necessary for the patient to be present for weekly physics review and no other superficial radiotherapy services were rendered on this day. Continued medical physics review post every 5th fraction of therapy is requested by provider for appropriate radiotherapy management and is deemed medically necessary and standard of care.

## 2024-01-25 ENCOUNTER — APPOINTMENT (OUTPATIENT)
Dept: URBAN - METROPOLITAN AREA CLINIC 306 | Age: 79
Setting detail: DERMATOLOGY
End: 2024-01-25

## 2024-01-25 PROBLEM — C44.01 BASAL CELL CARCINOMA OF SKIN OF LIP: Status: ACTIVE | Noted: 2024-01-25

## 2024-01-25 PROCEDURE — 99213 OFFICE O/P EST LOW 20 MIN: CPT | Mod: 25

## 2024-01-25 PROCEDURE — OTHER IMAGE GUIDED - SUPERFICIAL RADIOTHERAPY: OTHER

## 2024-01-25 PROCEDURE — OTHER DIAGNOSIS COMMENT: OTHER

## 2024-01-25 PROCEDURE — OTHER IMAGE GUIDED - SUPERFICIAL RADIOTHERAPY: EVALUATION VISIT: OTHER

## 2024-01-25 PROCEDURE — G6001 ECHO GUIDANCE RADIOTHERAPY: HCPCS

## 2024-01-25 NOTE — PROCEDURE: IMAGE GUIDED - SUPERFICIAL RADIOTHERAPY: EVALUATION VISIT
Was Ultrasound Performed Today?: Yes
Is This Visit For Evaluation During Treatment Or Follow Up Post Treatment?: follow up
Radiation Therapy Oncology Group (Rtog) Score: 0
Bill For Evaluation (35619)?: No
Assessment: Appropriate reaction
Ultrasound Used Text: Ultrasound depth is 0.0 mm.
Follow Up Plan: Physician Orders: Plan: Follow Up status post Superficial Radiotherapy with Ultrasound \\n\\nPer the request of Dr. Garrett Roberts, the patient was seen today for a 6 week follow up for Superficial Radiation Therapy. Physician evaluation of the ultrasound tumor depth, width and breadth was ongoing through course of treatment and is deemed medically necessary ensuring efficacy of treatment. All appropriate blocking and treatment parameters verified by radiation therapist according to initial simulation. A high frequency ultrasound image was acquired today for two-dimensional evaluation of treatment volume and response to treatment, in addition, geometric accuracy of field placement. \\n\\nToday’s image was evaluated, lesion not detected on ultrasound. \\n\\nObjectively, the treated radiation area was evaluated with regards to erythema, atrophy, scale, crusting, erosion, ulceration, edema, purpura, tenderness, warmth, drainage, and any other findings. No residual erythema, erosion, or crusting.  Patient does not note any pain/discomfort at the treatment site.  Treatment site has healed well and patient is very pleased with results.  Patient to follow up for routine visits.\\n\\nComplete clinical and ultrasonographic response.
Additional Comments (Add Customization Of Note Here): Pt asked to make a 6 month skin check with Dr.. Roberts
Ultrasound Not Used Text: Ultrasound was not performed today due to

## 2024-08-05 ENCOUNTER — APPOINTMENT (OUTPATIENT)
Dept: URBAN - METROPOLITAN AREA CLINIC 306 | Age: 79
Setting detail: DERMATOLOGY
End: 2024-08-05

## 2024-08-05 DIAGNOSIS — D18.0 HEMANGIOMA: ICD-10-CM

## 2024-08-05 DIAGNOSIS — L82.1 OTHER SEBORRHEIC KERATOSIS: ICD-10-CM

## 2024-08-05 DIAGNOSIS — L81.4 OTHER MELANIN HYPERPIGMENTATION: ICD-10-CM

## 2024-08-05 DIAGNOSIS — Z71.89 OTHER SPECIFIED COUNSELING: ICD-10-CM

## 2024-08-05 DIAGNOSIS — Z85.828 PERSONAL HISTORY OF OTHER MALIGNANT NEOPLASM OF SKIN: ICD-10-CM

## 2024-08-05 DIAGNOSIS — D22 MELANOCYTIC NEVI: ICD-10-CM

## 2024-08-05 PROBLEM — D22.72 MELANOCYTIC NEVI OF LEFT LOWER LIMB, INCLUDING HIP: Status: ACTIVE | Noted: 2024-08-05

## 2024-08-05 PROBLEM — D22.61 MELANOCYTIC NEVI OF RIGHT UPPER LIMB, INCLUDING SHOULDER: Status: ACTIVE | Noted: 2024-08-05

## 2024-08-05 PROBLEM — D22.62 MELANOCYTIC NEVI OF LEFT UPPER LIMB, INCLUDING SHOULDER: Status: ACTIVE | Noted: 2024-08-05

## 2024-08-05 PROBLEM — D18.01 HEMANGIOMA OF SKIN AND SUBCUTANEOUS TISSUE: Status: ACTIVE | Noted: 2024-08-05

## 2024-08-05 PROBLEM — D22.5 MELANOCYTIC NEVI OF TRUNK: Status: ACTIVE | Noted: 2024-08-05

## 2024-08-05 PROBLEM — D22.71 MELANOCYTIC NEVI OF RIGHT LOWER LIMB, INCLUDING HIP: Status: ACTIVE | Noted: 2024-08-05

## 2024-08-05 PROCEDURE — 99213 OFFICE O/P EST LOW 20 MIN: CPT

## 2024-08-05 PROCEDURE — OTHER COUNSELING: OTHER

## 2024-08-05 ASSESSMENT — LOCATION SIMPLE DESCRIPTION DERM
LOCATION SIMPLE: CHEST
LOCATION SIMPLE: RIGHT LOWER BACK
LOCATION SIMPLE: RIGHT POSTERIOR THIGH
LOCATION SIMPLE: RIGHT UPPER ARM
LOCATION SIMPLE: LEFT UPPER BACK
LOCATION SIMPLE: LEFT LIP
LOCATION SIMPLE: UPPER BACK
LOCATION SIMPLE: LEFT POSTERIOR UPPER ARM
LOCATION SIMPLE: LEFT THIGH
LOCATION SIMPLE: RIGHT SHOULDER
LOCATION SIMPLE: RIGHT POSTERIOR UPPER ARM
LOCATION SIMPLE: LEFT SHOULDER
LOCATION SIMPLE: LEFT UPPER ARM
LOCATION SIMPLE: RIGHT THIGH
LOCATION SIMPLE: UPPER BACK
LOCATION SIMPLE: LEFT LIP
LOCATION SIMPLE: LEFT POSTERIOR THIGH

## 2024-08-05 ASSESSMENT — LOCATION DETAILED DESCRIPTION DERM
LOCATION DETAILED: RIGHT PROXIMAL POSTERIOR UPPER ARM
LOCATION DETAILED: LEFT UPPER CUTANEOUS LIP
LOCATION DETAILED: LEFT MEDIAL UPPER BACK
LOCATION DETAILED: RIGHT DISTAL POSTERIOR THIGH
LOCATION DETAILED: LEFT PROXIMAL POSTERIOR UPPER ARM
LOCATION DETAILED: LEFT POSTERIOR SHOULDER
LOCATION DETAILED: RIGHT INFERIOR MEDIAL MIDBACK
LOCATION DETAILED: LEFT ANTERIOR PROXIMAL THIGH
LOCATION DETAILED: INFERIOR THORACIC SPINE
LOCATION DETAILED: RIGHT ANTERIOR PROXIMAL THIGH
LOCATION DETAILED: LEFT ANTERIOR PROXIMAL UPPER ARM
LOCATION DETAILED: RIGHT ANTERIOR PROXIMAL UPPER ARM
LOCATION DETAILED: LEFT DISTAL POSTERIOR THIGH
LOCATION DETAILED: LEFT INFERIOR UPPER BACK
LOCATION DETAILED: LEFT UPPER CUTANEOUS LIP
LOCATION DETAILED: INFERIOR THORACIC SPINE
LOCATION DETAILED: RIGHT POSTERIOR SHOULDER
LOCATION DETAILED: MIDDLE STERNUM
LOCATION DETAILED: RIGHT MEDIAL SUPERIOR CHEST

## 2024-08-05 ASSESSMENT — LOCATION ZONE DERM
LOCATION ZONE: TRUNK
LOCATION ZONE: ARM
LOCATION ZONE: LIP
LOCATION ZONE: LIP
LOCATION ZONE: TRUNK
LOCATION ZONE: LEG

## 2024-08-05 NOTE — HPI: FULL BODY SKIN EXAMINATION
How Severe Are Your Spot(S)?: mild
What Type Of Note Output Would You Prefer (Optional)?: Standard Output
What Is The Reason For Today's Visit?: Full Body Skin Examination
Home

## 2025-08-14 ENCOUNTER — APPOINTMENT (OUTPATIENT)
Dept: URBAN - METROPOLITAN AREA CLINIC 306 | Age: 80
Setting detail: DERMATOLOGY
End: 2025-08-14

## 2025-08-14 DIAGNOSIS — D18.0 HEMANGIOMA: ICD-10-CM

## 2025-08-14 DIAGNOSIS — Z85.828 PERSONAL HISTORY OF OTHER MALIGNANT NEOPLASM OF SKIN: ICD-10-CM

## 2025-08-14 DIAGNOSIS — L81.4 OTHER MELANIN HYPERPIGMENTATION: ICD-10-CM

## 2025-08-14 DIAGNOSIS — D22 MELANOCYTIC NEVI: ICD-10-CM

## 2025-08-14 DIAGNOSIS — Z71.89 OTHER SPECIFIED COUNSELING: ICD-10-CM

## 2025-08-14 DIAGNOSIS — L57.0 ACTINIC KERATOSIS: ICD-10-CM

## 2025-08-14 DIAGNOSIS — L60.9 NAIL DISORDER, UNSPECIFIED: ICD-10-CM

## 2025-08-14 PROBLEM — D22.62 MELANOCYTIC NEVI OF LEFT UPPER LIMB, INCLUDING SHOULDER: Status: ACTIVE | Noted: 2025-08-14

## 2025-08-14 PROBLEM — D22.71 MELANOCYTIC NEVI OF RIGHT LOWER LIMB, INCLUDING HIP: Status: ACTIVE | Noted: 2025-08-14

## 2025-08-14 PROBLEM — D22.72 MELANOCYTIC NEVI OF LEFT LOWER LIMB, INCLUDING HIP: Status: ACTIVE | Noted: 2025-08-14

## 2025-08-14 PROBLEM — D22.61 MELANOCYTIC NEVI OF RIGHT UPPER LIMB, INCLUDING SHOULDER: Status: ACTIVE | Noted: 2025-08-14

## 2025-08-14 PROBLEM — D22.5 MELANOCYTIC NEVI OF TRUNK: Status: ACTIVE | Noted: 2025-08-14

## 2025-08-14 PROBLEM — D18.01 HEMANGIOMA OF SKIN AND SUBCUTANEOUS TISSUE: Status: ACTIVE | Noted: 2025-08-14

## 2025-08-14 PROCEDURE — OTHER LIQUID NITROGEN: OTHER

## 2025-08-14 PROCEDURE — 17003 DESTRUCT PREMALG LES 2-14: CPT

## 2025-08-14 PROCEDURE — 17000 DESTRUCT PREMALG LESION: CPT

## 2025-08-14 PROCEDURE — OTHER NAIL CLIPPING FOR PAS: OTHER

## 2025-08-14 PROCEDURE — 99213 OFFICE O/P EST LOW 20 MIN: CPT | Mod: 25

## 2025-08-14 PROCEDURE — OTHER COUNSELING: OTHER

## 2025-08-14 ASSESSMENT — LOCATION SIMPLE DESCRIPTION DERM
LOCATION SIMPLE: RIGHT SHOULDER
LOCATION SIMPLE: RIGHT FOREHEAD
LOCATION SIMPLE: CHEST
LOCATION SIMPLE: RIGHT POSTERIOR THIGH
LOCATION SIMPLE: RIGHT POSTERIOR UPPER ARM
LOCATION SIMPLE: LEFT UPPER BACK
LOCATION SIMPLE: LEFT POSTERIOR THIGH
LOCATION SIMPLE: LEFT SHOULDER
LOCATION SIMPLE: UPPER BACK
LOCATION SIMPLE: RIGHT GREAT TOE
LOCATION SIMPLE: LEFT POSTERIOR UPPER ARM
LOCATION SIMPLE: RIGHT UPPER ARM
LOCATION SIMPLE: NOSE
LOCATION SIMPLE: LEFT UPPER ARM

## 2025-08-14 ASSESSMENT — LOCATION ZONE DERM
LOCATION ZONE: NOSE
LOCATION ZONE: TRUNK
LOCATION ZONE: LEG
LOCATION ZONE: ARM
LOCATION ZONE: FACE
LOCATION ZONE: TOENAIL

## 2025-08-14 ASSESSMENT — LOCATION DETAILED DESCRIPTION DERM
LOCATION DETAILED: RIGHT POSTERIOR SHOULDER
LOCATION DETAILED: RIGHT MEDIAL SUPERIOR CHEST
LOCATION DETAILED: LEFT DISTAL POSTERIOR THIGH
LOCATION DETAILED: LEFT INFERIOR UPPER BACK
LOCATION DETAILED: RIGHT DISTAL POSTERIOR THIGH
LOCATION DETAILED: NASAL DORSUM
LOCATION DETAILED: LEFT ANTERIOR PROXIMAL UPPER ARM
LOCATION DETAILED: RIGHT GREAT TOENAIL
LOCATION DETAILED: RIGHT PROXIMAL POSTERIOR UPPER ARM
LOCATION DETAILED: RIGHT SUPERIOR FOREHEAD
LOCATION DETAILED: INFERIOR THORACIC SPINE
LOCATION DETAILED: MIDDLE STERNUM
LOCATION DETAILED: RIGHT ANTERIOR PROXIMAL UPPER ARM
LOCATION DETAILED: LEFT POSTERIOR SHOULDER
LOCATION DETAILED: LEFT PROXIMAL POSTERIOR UPPER ARM

## 2025-08-27 ENCOUNTER — RX ONLY (RX ONLY)
Age: 80
End: 2025-08-27

## 2025-08-27 RX ORDER — CLOBETASOL PROPIONATE 0.5 MG/ML
SOLUTION TOPICAL
Qty: 50 | Refills: 0 | Status: ERX | COMMUNITY
Start: 2025-08-27